# Patient Record
Sex: MALE | Race: WHITE | NOT HISPANIC OR LATINO | Employment: FULL TIME | URBAN - METROPOLITAN AREA
[De-identification: names, ages, dates, MRNs, and addresses within clinical notes are randomized per-mention and may not be internally consistent; named-entity substitution may affect disease eponyms.]

---

## 2019-08-29 ENCOUNTER — TRANSFERRED RECORDS (OUTPATIENT)
Dept: HEALTH INFORMATION MANAGEMENT | Facility: CLINIC | Age: 59
End: 2019-08-29

## 2019-09-06 ENCOUNTER — TRANSFERRED RECORDS (OUTPATIENT)
Dept: HEALTH INFORMATION MANAGEMENT | Facility: CLINIC | Age: 59
End: 2019-09-06

## 2020-08-26 ENCOUNTER — TRANSFERRED RECORDS (OUTPATIENT)
Dept: HEALTH INFORMATION MANAGEMENT | Facility: CLINIC | Age: 60
End: 2020-08-26

## 2020-09-02 ENCOUNTER — TRANSFERRED RECORDS (OUTPATIENT)
Dept: HEALTH INFORMATION MANAGEMENT | Facility: CLINIC | Age: 60
End: 2020-09-02

## 2020-09-02 LAB
ALT SERPL-CCNC: 15 U/L
AST SERPL-CCNC: 17 U/L
CHOLESTEROL (EXTERNAL): 214 MG/DL
CREATININE (EXTERNAL): 1.1 MG/DL (ref 0.7–1.2)
GFR ESTIMATED (EXTERNAL): 74 ML/MIN/1.73M2
GLUCOSE (EXTERNAL): 92 MG/DL (ref 70–99)
HDLC SERPL-MCNC: 59 MG/DL
LDL CHOLESTEROL (EXTERNAL): 137 MG/DL
POTASSIUM (EXTERNAL): 5.8 MMOL/L (ref 3.3–5.1)
TRIGLYCERIDES (EXTERNAL): 92 MG/DL
TSH SERPL-ACNC: 5.8 MIU/ML (ref 0.27–4.2)

## 2020-09-04 ENCOUNTER — TRANSFERRED RECORDS (OUTPATIENT)
Dept: HEALTH INFORMATION MANAGEMENT | Facility: CLINIC | Age: 60
End: 2020-09-04

## 2020-09-11 LAB — POTASSIUM (EXTERNAL): 5.2 MMOL/L (ref 3.3–5.1)

## 2020-10-02 ENCOUNTER — TRANSFERRED RECORDS (OUTPATIENT)
Dept: HEALTH INFORMATION MANAGEMENT | Facility: CLINIC | Age: 60
End: 2020-10-02

## 2020-10-02 LAB — POTASSIUM (EXTERNAL): 4.5 MMOL/L (ref 3.3–5.1)

## 2020-10-09 LAB — TSH SERPL-ACNC: 2.81 MIU/ML (ref 0.27–4.2)

## 2021-06-25 ENCOUNTER — TRANSFERRED RECORDS (OUTPATIENT)
Dept: HEALTH INFORMATION MANAGEMENT | Facility: CLINIC | Age: 61
End: 2021-06-25

## 2021-10-08 ENCOUNTER — TRANSFERRED RECORDS (OUTPATIENT)
Dept: HEALTH INFORMATION MANAGEMENT | Facility: CLINIC | Age: 61
End: 2021-10-08

## 2021-10-08 LAB
ALT SERPL-CCNC: 17 U/L
AST SERPL-CCNC: 17 U/L
CHOLESTEROL (EXTERNAL): 225 MG/DL
CREATININE (EXTERNAL): 1.1 MG/DL (ref 0.7–1.2)
GFR ESTIMATED (EXTERNAL): 74 ML/MIN/1.73M2
GLUCOSE (EXTERNAL): 100 MG/DL (ref 70–99)
HDLC SERPL-MCNC: 63 MG/DL
LDL CHOLESTEROL (EXTERNAL): 138 MG/DL
POTASSIUM (EXTERNAL): 5.1 MMOL/L (ref 3.3–5.1)
TRIGLYCERIDES (EXTERNAL): 118 MG/DL
TSH SERPL-ACNC: 4.54 MIU/ML (ref 0.27–4.2)

## 2022-08-19 ENCOUNTER — TRANSFERRED RECORDS (OUTPATIENT)
Dept: HEALTH INFORMATION MANAGEMENT | Facility: CLINIC | Age: 62
End: 2022-08-19

## 2022-10-22 ENCOUNTER — HEALTH MAINTENANCE LETTER (OUTPATIENT)
Age: 62
End: 2022-10-22

## 2022-10-25 ENCOUNTER — VIRTUAL VISIT (OUTPATIENT)
Dept: FAMILY MEDICINE | Facility: CLINIC | Age: 62
End: 2022-10-25
Payer: COMMERCIAL

## 2022-10-25 DIAGNOSIS — R06.83 SNORING: ICD-10-CM

## 2022-10-25 DIAGNOSIS — M25.562 CHRONIC PAIN OF LEFT KNEE: ICD-10-CM

## 2022-10-25 DIAGNOSIS — G47.00 INSOMNIA, UNSPECIFIED TYPE: ICD-10-CM

## 2022-10-25 DIAGNOSIS — Z80.9 FAMILY HISTORY OF SQUAMOUS CELL CARCINOMA: ICD-10-CM

## 2022-10-25 DIAGNOSIS — G89.29 CHRONIC PAIN OF LEFT KNEE: ICD-10-CM

## 2022-10-25 DIAGNOSIS — Z76.89 ENCOUNTER TO ESTABLISH CARE WITH NEW DOCTOR: Primary | ICD-10-CM

## 2022-10-25 DIAGNOSIS — F90.0 ADHD (ATTENTION DEFICIT HYPERACTIVITY DISORDER), INATTENTIVE TYPE: ICD-10-CM

## 2022-10-25 PROBLEM — E78.00 HYPERCHOLESTEROLEMIA: Status: ACTIVE | Noted: 2020-09-07

## 2022-10-25 PROBLEM — M20.011 ACQUIRED MALLET DEFORMITY OF RIGHT LITTLE FINGER: Status: ACTIVE | Noted: 2017-04-06

## 2022-10-25 PROBLEM — L82.1 SEBORRHEIC KERATOSES: Status: ACTIVE | Noted: 2017-08-04

## 2022-10-25 PROBLEM — N40.1 BENIGN PROSTATIC HYPERPLASIA WITH LOWER URINARY TRACT SYMPTOMS: Status: ACTIVE | Noted: 2018-08-09

## 2022-10-25 PROBLEM — L81.9 ATYPICAL PIGMENTED SKIN LESION: Status: ACTIVE | Noted: 2017-07-21

## 2022-10-25 PROBLEM — R79.89 LOW VITAMIN B12 LEVEL: Status: ACTIVE | Noted: 2022-05-01

## 2022-10-25 PROBLEM — R73.01 ELEVATED FASTING BLOOD SUGAR: Status: ACTIVE | Noted: 2021-10-12

## 2022-10-25 PROBLEM — M54.50 LUMBAR BACK PAIN: Status: ACTIVE | Noted: 2022-03-25

## 2022-10-25 PROCEDURE — 99204 OFFICE O/P NEW MOD 45 MIN: CPT | Mod: 95 | Performed by: FAMILY MEDICINE

## 2022-10-25 RX ORDER — TRAZODONE HYDROCHLORIDE 100 MG/1
100 TABLET ORAL AT BEDTIME
Qty: 90 TABLET | Refills: 3 | Status: SHIPPED | OUTPATIENT
Start: 2022-10-25 | End: 2023-10-27

## 2022-10-25 RX ORDER — DEXTROAMPHETAMINE SACCHARATE, AMPHETAMINE ASPARTATE, DEXTROAMPHETAMINE SULFATE AND AMPHETAMINE SULFATE 7.5; 7.5; 7.5; 7.5 MG/1; MG/1; MG/1; MG/1
1.5 TABLET ORAL DAILY
COMMUNITY
Start: 2012-10-24

## 2022-10-25 RX ORDER — SILDENAFIL 100 MG/1
1 TABLET, FILM COATED ORAL DAILY
COMMUNITY
Start: 2012-10-24 | End: 2023-03-16

## 2022-10-25 NOTE — Clinical Note
Please abstract the following data from this visit with this patient into the appropriate field in Epic:    Other Tests found in the patient's chart through Chart Review/Care Everywhere:  Colonoscopy done by this group [facility] on this date: 2019  Note to Abstraction: If this section is blank, no results were found via Chart Review/Care Everywhere.

## 2022-10-25 NOTE — PROGRESS NOTES
Answers for HPI/ROS submitted by the patient on 10/24/2022  What is the reason for your visit today? : new patient first appointment  How many servings of fruits and vegetables do you eat daily?: 2-3  On average, how many sweetened beverages do you drink each day (Examples: soda, juice, sweet tea, etc.  Do NOT count diet or artificially sweetened beverages)?: 2  How many minutes a day do you exercise enough to make your heart beat faster?: 10 to 19  How many days a week do you exercise enough to make your heart beat faster?: 4  How many days per week do you miss taking your medication?: 0    Bob is a 61 year old who is being evaluated via a billable video visit.      How would you like to obtain your AVS? MyChart  If the video visit is dropped, the invitation should be resent by: Text to cell phone: [phone]  Will anyone else be joining your video visit? No        Assessment & Plan     Encounter to establish care with new doctor  New to me today; reviewed and updated past medical history and problem list and allergies via chart review ([facility], Care Everywhere) and with patient.      ADHD (attention deficit hyperactivity disorder), inattentive type    Discussed at length    Hasn't had formal evaluation he doesn't think, but been on Adderall for ~10 yrs through PCP in [place]. Referred.    Discussed ADHD agreement and routine utox; will do with physical in December    For now - stop midday dose and see if helps sleep.    Insomnia, unspecified type    Discussed trazodone, sleep hygiene    Also discussed stopping midday Adderall to see if helps and may not need trazodone    Will follow at visit in December  - traZODone (DESYREL) 100 MG tablet; Take 1 tablet (100 mg) by mouth At Bedtime    Chronic pain of left knee  Referred sports medicine - Dr. Patel, Dr. Mares or Dr. Butler are all great.  - Orthopedic  Referral; Future    Family history of squamous cell carcinoma    Dad  of SCC.    Referred to derm  "to establish care  - Adult Dermatology Referral; Future    Snoring  - Adult Sleep Eval & Management  Referral; Future        Return for already scheduled appt.    Sybil Arellano MD  [facility] REJI Rojas is a 61 year old, presenting for the following health issues:  Establish Care (No concerns )      History of Present Illness       Reason for visit:  New patient first appointment    He eats 2-3 servings of fruits and vegetables daily.He consumes 2 sweetened beverage(s) daily.He exercises with enough effort to increase his heart rate 10 to 19 minutes per day.  He exercises with enough effort to increase his heart rate 4 days per week.   He is taking medications regularly.       61 year old - moved to [place] from [place].  A few things - to establish care.    Has a few questions:  1. Trazodone?  Has taken before, out of prescription.  Been taking more often lately as older harder to sleep.  50 mg.  Takes 100 mg - 50 mg doesn't work.  No grogginess on a normal night (unless takes too late).    2. Also - Adderall 30 mg.  Used to get through prior doctor in [place].  Good through next month.  Then will need renewal.    Takes 8 am.  Half dose at noon.    Has taken for 10 year.    Originally: diagnosed by PCP.    Also: for history - had knee ACL surgery replacement cadaver 20 yrs ago.  Notices still a little weak.  Doesn't want another surgery.    Finally:  Recommendation for derm.  Dad  of squamous cell and  of skin cancer.    Also:  Imm questions.    Also:  Breathing at night?  Get sleep study?    Also:  Sildenafil not working as well.          Review of Systems   Constitutional, HEENT, cardiovascular, pulmonary, gi and gu systems are negative, except as otherwise noted.      Objective    Vitals - Patient Reported  Weight (Patient Reported): 195 lb (88.5 kg)  Height (Patient Reported): 5' 9\" (175.3 cm)  BMI (Based on Pt Reported Ht/Wt): 28.8        Physical " Exam   GENERAL: Healthy, alert and no distress  EYES: Eyes grossly normal to inspection.  No discharge or erythema, or obvious scleral/conjunctival abnormalities.  RESP: No audible wheeze, cough, or visible cyanosis.  No visible retractions or increased work of breathing.    SKIN: Visible skin clear. No significant rash, abnormal pigmentation or lesions.  NEURO: Cranial nerves grossly intact.  Mentation and speech appropriate for age.  PSYCH: Mentation appears normal, affect normal/bright, judgement and insight intact, normal speech and appearance well-groomed.            Video-Visit Details    Video Start Time: 9:43 AM    Type of service:  Video Visit    Video End Time:10:06 AM    Originating Location (pt. Location): Home    Distant Location (provider location):  On-site    Platform used for Video Visit: KarisWell

## 2022-11-21 ENCOUNTER — HOSPITAL ENCOUNTER (OUTPATIENT)
Dept: BEHAVIORAL HEALTH | Facility: CLINIC | Age: 62
Discharge: HOME | End: 2022-11-21
Attending: FAMILY MEDICINE

## 2022-11-21 DIAGNOSIS — F98.8 ATTENTION DEFICIT DISORDER, UNSPECIFIED HYPERACTIVITY PRESENCE: Primary | ICD-10-CM

## 2022-11-21 PROCEDURE — 999N000216 HC STATISTIC ADULT CD FACE TO FACE-NO CHRG: Mod: GT | Performed by: PSYCHOLOGIST

## 2022-11-21 ASSESSMENT — ANXIETY QUESTIONNAIRES
GAD7 TOTAL SCORE: 2
5. BEING SO RESTLESS THAT IT IS HARD TO SIT STILL: SEVERAL DAYS
2. NOT BEING ABLE TO STOP OR CONTROL WORRYING: NOT AT ALL
3. WORRYING TOO MUCH ABOUT DIFFERENT THINGS: NOT AT ALL
6. BECOMING EASILY ANNOYED OR IRRITABLE: NOT AT ALL
1. FEELING NERVOUS, ANXIOUS, OR ON EDGE: SEVERAL DAYS
7. FEELING AFRAID AS IF SOMETHING AWFUL MIGHT HAPPEN: NOT AT ALL
4. TROUBLE RELAXING: NOT AT ALL
GAD7 TOTAL SCORE: 2

## 2022-11-21 ASSESSMENT — COLUMBIA-SUICIDE SEVERITY RATING SCALE - C-SSRS
2. HAVE YOU ACTUALLY HAD ANY THOUGHTS OF KILLING YOURSELF IN THE PAST MONTH?: NO
3. HAVE YOU BEEN THINKING ABOUT HOW YOU MIGHT KILL YOURSELF?: NO
5. HAVE YOU STARTED TO WORK OUT OR WORKED OUT THE DETAILS OF HOW TO KILL YOURSELF? DO YOU INTEND TO CARRY OUT THIS PLAN?: NO
1. IN THE PAST MONTH, HAVE YOU WISHED YOU WERE DEAD OR WISHED YOU COULD GO TO SLEEP AND NOT WAKE UP?: NO
6. HAVE YOU EVER DONE ANYTHING, STARTED TO DO ANYTHING, OR PREPARED TO DO ANYTHING TO END YOUR LIFE?: NO
4. HAVE YOU HAD THESE THOUGHTS AND HAD SOME INTENTION OF ACTING ON THEM?: NO

## 2022-11-21 ASSESSMENT — PATIENT HEALTH QUESTIONNAIRE - PHQ9: SUM OF ALL RESPONSES TO PHQ QUESTIONS 1-9: 1

## 2022-11-21 NOTE — PROGRESS NOTES
"[facility]    ADULT Mental Health Assessment Appointment Note    Patient name:  Bob Rodriguez    Preferred Pronoun: kristie him  MRN: [id number]  YOB: 1960  Address:  [address] [place] [id number]  Preferred phone: [phone]   May we leave a referral related message: Yes    Date of service: 22  Start time: 0800  End time: 0845  Service modality:  Video Visit:      Provider verified identity through the following two step process.  Patient provided:  Patient  and Patient address    Telemedicine Visit: The patient's condition can be safely assessed and treated via synchronous audio and visual telemedicine encounter.      Reason for Telemedicine Visit: Services only offered telehealth    Originating Site (Patient Location): Patient's home    Distant Site (Provider Location): [facility]    Consent:  The patient/guardian has verbally consented to: the potential risks and benefits of telemedicine (video visit) versus in person care; bill my insurance or make self-payment for services provided; and responsibility for payment of non-covered services.     Patient would like the video invitation sent by:  My Chart    Mode of Communication:  Video Conference via Amdianboom    Distant Location (Provider):  Off-site    As the provider I attest to compliance with applicable laws and regulations related to telemedicine.    Identifying Information:  Patient is a 62 year old, male.  Patient was referred for an assessment by Dr Arellano at  [facility].  Patient attended the session alone. Patient identified their preferred language to be English. Patient reported they do not need the assistance of an  or other support involved in therapy.     Services Requested:   The reason for seeking services at this time is: \" evaluation of continued medication management / ADHD \"  Patient does " not have legal concerns.  Pt reports he is a 62 year old male who presents today with need to transfer care to [place] from [place] where he has been treated for ADD for over a decade. He reports he was originally diagnosed by his [place] PCP. He moved to [place] a few months ago and his original prescriber has filled his meds again recently until he can find care in this state.  Pt moved to [place] to be closer to his aging mother and extended family.  He has no hx of other mental health treatment.  He was referred to this department to assist him in his attempt to find a provider who will continue his prescription.  He has also been referred for a sleep study.    Mental Health:  Review of Symptoms per patient report:  Depression: Change in sleep  Maryellen: No Symptoms  Psychosis: No Symptoms  Anxiety: occasionally feel anxious, maybe a day in the past two weeks  Panic: No symptoms  Post Traumatic Stress Disorder: No Symptoms  Eating Disorder: No Symptoms  ADD / ADHD: Inattentive, Difficulties listening, Poor task completion, Poor organizational skills, Distractibility, Forgetful and Restlessness/fidgety  Conduct Disorder: No symptoms  Autism Spectrum Disorder: No symptoms  Obsessive Compulsive Disorder: No Symptoms    Substance Use:  Do you  have a history of alcohol or illicit drug use? No and CAGE of 0 with alcohol use about every month a drink, last use 10/29  Never required treatment of any kind    Significant Losses / Trauma / Abuse / Neglect Issues:   Patient did not serve in the .  There are indications or report of significant loss, trauma, abuse or neglect issues related to: are no indications and client denies any losses, trauma, abuse, or neglect concerns.  Concerns for possible neglect are not present.     Medical History:  Patient reports no current medical concerns.  Patient denies any issues with pain..   There are not significant appetite / nutritional concerns / weight changes.   Patient does not report a  history of head injury / trauma / cognitive impairment.      Current Mental Status Exam:   Appearance:  Appropriate    Eye Contact:  Good   Psychomotor:  Normal   Attitude / Demeanor: Cooperative  Pleasant  Speech Rate:  Normal/ Responsive  Volume:  Normal  volume  Language:  intact  Mood:   Normal  Affect:   Appropriate    Thought Content: Clear   Thought Process: Goal Directed  Logical     Associations:  No loosening of associations  Insight:   Good   Judgment:  Intact   Orientation:  All  Attention:  Good    Rating Scales:  PHQ9:    PHQ-9 SCORE 11/21/2022   PHQ-9 Total Score 1   ;    GAD7:  No flowsheet data found.  [place] Suicide Severity Rating Scale (Lifetime/Recent)  [place] Suicide Severity Rating (Lifetime/Recent) 11/21/2022   Q1 Wished to be Dead (Past Month) no   Q2 Suicidal Thoughts (Past Month) no   Q3 Suicidal Thought Method no   Q4 Suicidal Intent without Specific Plan no   Q5 Suicide Intent with Specific Plan no   Q6 Suicide Behavior (Lifetime) no   Level of Risk per Screen low risk       Safety Assessment:   Current Safety Concerns:  [place] Suicide Severity Rating Scale (Lifetime/Recent)  [place] Suicide Severity Rating (Lifetime/Recent) 11/21/2022   Q1 Wished to be Dead (Past Month) no   Q2 Suicidal Thoughts (Past Month) no   Q3 Suicidal Thought Method no   Q4 Suicidal Intent without Specific Plan no   Q5 Suicide Intent with Specific Plan no   Q6 Suicide Behavior (Lifetime) no   Level of Risk per Screen low risk     Patient denies current homicidal ideation and behaviors.  Patient denies current self-injurious ideation and behaviors.    Patient denied risk behaviors associated with substance use.  Patient denies any high risk behaviors associated with mental health symptoms.  Patient reports the following current concerns for their personal safety: None.  Patient reports there are not  firearms in the house. There are no firearms in the home..     Clinical Impressions:  pt reports prior hx of  dx ADHD.  He will be referred for a formal evaluation.  He has no other mental health dx      Clinical Substantiation:  Summary of Visit: pt is a 62 year old gentleman who presents for assistance in transferring his medical care to [place] providers.  His PCP referred him and may have thought he would be receiving an ADHD evaluation which this department does not offer.  An order was placed for a formal ADHD evaluation and an appointment was made for pt to see a mental health prescriber who may be able to continue pt's prescriptions at this time.  Pt has no hx of acute care, no hx of other mental health concerns.  He is highly functional and without problems in judgment.  No SI, HI or SIB and no hx of such.      Therapeutic Interventions Provided: supportive active listening, provided Psychoeducational support and identified problem solving techniques     Recommendations/Plan: entered Order for ADHD eval and set appointment as noted below.      Referral: Date: Tuesday, 11/29/2022  Time: 11:00 am - 12:00 pm  Provider: Marcello CR  CNP,PMHNP,RN  Location: [address] [place] [address]  Phone: [phone]  Type: Telepsychiatry      Susan Shelton, [facility],  November 21, 2022  [facility]

## 2022-11-21 NOTE — PATIENT INSTRUCTIONS
Dear Bob,    This is a reminder that an appointment for mental health services has been scheduled for you.  Please contact your insurance company directly to verify coverage, eligibility, payment, and  benefit information.    Appointment Date: 11/29/2022  Appointment Time: 11:00 AM  Location: [facility]  Marcello Sepulveda CNP,PMZOP,RN  [address] [place] [id number] [phone]    For directions and/or questions regarding the appointment, please contact the clinic or provider  directly at the phone number listed above.    Your appointment is scheduled outside of [facility]. Behavioral Healthcare  Providers ([facility]) maintains an extensive network of licensed behavioral health providers to  connect patients with the services they need. We do not charge providers a fee to participate  in our referral network. We match patients with providers based on a patient s specific  treatment needs, insurance coverage, and location. Our first effort will be to refer you to a  provider within your care system and will utilize providers outside of your care system as  needed.    Sincerely,  [facility] Behavioral Health Access Staff  [facility]  [address] [place] [id number] [phone]

## 2022-11-22 ENCOUNTER — MYC MEDICAL ADVICE (OUTPATIENT)
Dept: FAMILY MEDICINE | Facility: CLINIC | Age: 62
End: 2022-11-22

## 2022-11-23 NOTE — TELEPHONE ENCOUNTER
Patient called in to check status on questions. Is needing clarification that he can have exam done by outside organization and that it will still be valid. Follow up via MyC

## 2022-12-01 ENCOUNTER — MYC MEDICAL ADVICE (OUTPATIENT)
Dept: FAMILY MEDICINE | Facility: CLINIC | Age: 62
End: 2022-12-01

## 2022-12-17 ENCOUNTER — NURSE TRIAGE (OUTPATIENT)
Dept: NURSING | Facility: CLINIC | Age: 62
End: 2022-12-17

## 2022-12-17 NOTE — TELEPHONE ENCOUNTER
Nurse Triage SBAR    Situation: Pt calls to report positive home test for covid.    Background: Patient,Bob, calling. Consent: not needed.    Assessment:  Sx started yesterday: headaches, congestion, cough, low grade fever, runny nose, sneezing.  Temp: 99.5    Protocol Recommended Disposition: See Physician within 24 hours    Recommendation: According to the protocol, Patient should be seen within 24 hours. Advised Patient to be seen within 24 hours. Care advice given. Patient verbalizes understanding and agrees with plan of care. Reviewed concerning symptoms and when to call back. Connected with scheduling    Clare Hoover RN on 12/17/2022 at 11:54 AM      Reason for Disposition    [1] HIGH RISK patient AND [2] influenza exposure within the last 7 days AND [3] ONE OR MORE respiratory symptoms: cough, sore throat, runny or stuffy nose    Additional Information    Negative: SEVERE difficulty breathing (e.g., struggling for each breath, speaks in single words)    Negative: Bluish (or gray) lips or face now    Negative: Shock suspected (e.g., cold/pale/clammy skin, too weak to stand, low BP, rapid pulse)    Negative: Sounds like a life-threatening emergency to the triager    Negative: Difficult to awaken or acting confused (e.g., disoriented, slurred speech)    Negative: SEVERE or constant chest pain or pressure  (Exception: Mild central chest pain, present only when coughing.)    Negative: MODERATE difficulty breathing (e.g., speaks in phrases, SOB even at rest, pulse 100-120)    Negative: [1] Headache AND [2] stiff neck (can't touch chin to chest)    Negative: Chest pain or pressure    Negative: Patient sounds very sick or weak to the triager    Negative: Oxygen level (e.g., pulse oximetry) 90 percent or lower    Negative: HIGH RISK for severe COVID complications (e.g., weak immune system, age > 64 years, obesity with BMI > 25, pregnant, chronic lung disease or other chronic medical condition)  (Exception: Already  seen by PCP and no new or worsening symptoms.)    Negative: [1] HIGH RISK patient AND [2] influenza is widespread in the community AND [3] ONE OR MORE respiratory symptoms: cough, sore throat, runny or stuffy nose    Protocols used: CORONAVIRUS (COVID-19) DIAGNOSED OR [id number] 1.18.2022

## 2022-12-19 ENCOUNTER — VIRTUAL VISIT (OUTPATIENT)
Dept: FAMILY MEDICINE | Facility: CLINIC | Age: 62
End: 2022-12-19
Payer: COMMERCIAL

## 2022-12-19 DIAGNOSIS — R09.81 NASAL CONGESTION: ICD-10-CM

## 2022-12-19 DIAGNOSIS — R50.9 FEVER AND CHILLS: ICD-10-CM

## 2022-12-19 DIAGNOSIS — U07.1 INFECTION DUE TO 2019 NOVEL CORONAVIRUS: Primary | ICD-10-CM

## 2022-12-19 DIAGNOSIS — B97.89 SORE THROAT (VIRAL): ICD-10-CM

## 2022-12-19 DIAGNOSIS — J02.8 SORE THROAT (VIRAL): ICD-10-CM

## 2022-12-19 DIAGNOSIS — R53.81 MALAISE: ICD-10-CM

## 2022-12-19 DIAGNOSIS — R05.1 ACUTE COUGH: ICD-10-CM

## 2022-12-19 PROCEDURE — 99213 OFFICE O/P EST LOW 20 MIN: CPT | Mod: CS | Performed by: INTERNAL MEDICINE

## 2022-12-19 RX ORDER — GUAIFENESIN/DEXTROMETHORPHAN 100-10MG/5
10 SYRUP ORAL EVERY 4 HOURS PRN
Qty: 354 ML | Refills: 3 | Status: SHIPPED | OUTPATIENT
Start: 2022-12-19 | End: 2022-12-29

## 2022-12-19 RX ORDER — PREDNISONE 20 MG/1
TABLET ORAL
Qty: 20 TABLET | Refills: 0 | Status: SHIPPED | OUTPATIENT
Start: 2022-12-19 | End: 2022-12-29

## 2022-12-19 RX ORDER — NIRMATRELVIR AND RITONAVIR 300-100 MG
3 KIT ORAL 2 TIMES DAILY
Qty: 30 EACH | Refills: 0 | Status: SHIPPED | OUTPATIENT
Start: 2022-12-19 | End: 2022-12-29

## 2022-12-19 NOTE — PROGRESS NOTES
Bob is a 62 year old who is being evaluated via a billable telephone visit.      What phone number would you like to be contacted at? [phone]  How would you like to obtain your AVS? Daniele  Distant Location (provider location):  Off-site         Subjective   Bob is a 62 year old presenting for the following health issues:  Covid Concern      HPI       COVID-19 Symptom Review  How many days ago did these symptoms start? 12/16/2022    Are any of the following symptoms significant for you?    New or worsening difficulty breathing? No    Worsening cough? No    Fever or chills? Yes, I felt feverish or had chills.    Headache: YES    Sore throat: YES    Chest pain: No    Diarrhea: No    Body aches? YES    What treatments has patient tried? Acetaminophen   Does patient live in a nursing home, group home, or shelter? No  Does patient have a way to get food/medications during quarantined? Yes, I have a friend or family member who can help me.      Current Medications:     Current Outpatient Medications   Medication Sig Dispense Refill     amphetamine-dextroamphetamine (ADDERALL) 30 MG tablet Take 1.5 tablets by mouth daily       guaiFENesin-dextromethorphan (ROBITUSSIN DM) 100-10 MG/5ML syrup Take 10 mLs by mouth every 4 hours as needed for cough 354 mL 3     nirmatrelvir and ritonavir (PAXLOVID, 300/100,) therapy pack Take 3 tablets by mouth 2 times daily 30 each 0     predniSONE (DELTASONE) 20 MG tablet Take 3 tabs by mouth daily x 3 days, then 2 tabs daily x 3 days, then 1 tab daily x 3 days, then 1/2 tab daily x 3 days. 20 tablet 0     sildenafil (VIAGRA) 100 MG tablet Take 1 tablet by mouth daily       traZODone (DESYREL) 100 MG tablet Take 1 tablet (100 mg) by mouth At Bedtime 90 tablet 3         Allergies:      Allergies   Allergen Reactions     No Known Allergies             Past Medical History:     Past Medical History:   Diagnosis Date     Degenerative arthritis of knee 11/15/2012         Past Surgical  History:     Past Surgical History:   Procedure Laterality Date     COLONOSCOPY  03/01/2019     ORTHOPEDIC SURGERY  2004         Family Medical History:     Family History   Problem Relation Age of Onset     Other Cancer Father         Father passed away from skin cancer         Social History:     Social History     Socioeconomic History     Marital status:      Spouse name: Not on file     Number of children: Not on file     Years of education: Not on file     Highest education level: Not on file   Occupational History     Not on file   Tobacco Use     Smoking status: Never     Smokeless tobacco: Never   Vaping Use     Vaping Use: Never used   Substance and Sexual Activity     Alcohol use: Yes     Comment: infrequent drinker - one glass per week     Drug use: Never     Sexual activity: Yes     Partners: Female     Birth control/protection: Male Surgical   Other Topics Concern     Parent/sibling w/ CABG, MI or angioplasty before 65F 55M? No   Social History Narrative     Not on file     Social Determinants of Health     Financial Resource Strain: Not on file   Food Insecurity: Not on file   Transportation Needs: Not on file   Physical Activity: Not on file   Stress: Not on file   Social Connections: Not on file   Intimate Partner Violence: Not on file   Housing Stability: Not on file           Review of System:     Constitutional: positive for fever or chills  Skin: Negative for rashes  Ears/Nose/Throat: positive for nasal congestion, sore throat  Respiratory: positive for cough, COVID infection  Cardiovascular: Negative for chest pain  Gastrointestinal: Negative for nausea, vomiting  Genitourinary: Negative for dysuria, hematuria  Musculoskeletal: Negative for myalgias  Neurologic: Negative for headaches  Psychiatric: Negative for depression, anxiety  Hematologic/Lymphatic/Immunologic: Negative  Endocrine: Negative  Behavioral: Negative for tobacco use       Physical Exam:   There were no vitals taken for  this visit.    RESP: cough present   NEURO: Alert & Oriented x 3.   PSYCH: mentation appears normal, affect normal        Diagnostic Test Results:     Diagnostic Test Results:  Labs reviewed in Epic    ASSESSMENT/PLAN:       Bob was seen today for covid concern.    Diagnoses and all orders for this visit:    Infection due to 2019 novel coronavirus  -     predniSONE (DELTASONE) 20 MG tablet; Take 3 tabs by mouth daily x 3 days, then 2 tabs daily x 3 days, then 1 tab daily x 3 days, then 1/2 tab daily x 3 days.  -     nirmatrelvir and ritonavir (PAXLOVID, 300/100,) therapy pack; Take 3 tablets by mouth 2 times daily  -     guaiFENesin-dextromethorphan (ROBITUSSIN DM) 100-10 MG/5ML syrup; Take 10 mLs by mouth every 4 hours as needed for cough    Acute cough    Malaise    Fever and chills    Nasal congestion    Sore throat (viral)            Follow Up Plan:     Patient is instructed to return to Internal Medicine clinic for follow-up visit in 1 week.        Gracie De La Cruz MD  Internal Medicine  [facility]        telephone visit duration including chart review, medication management: 30 minutes

## 2022-12-29 ENCOUNTER — OFFICE VISIT (OUTPATIENT)
Dept: FAMILY MEDICINE | Facility: CLINIC | Age: 62
End: 2022-12-29
Payer: COMMERCIAL

## 2022-12-29 ENCOUNTER — ANCILLARY PROCEDURE (OUTPATIENT)
Dept: GENERAL RADIOLOGY | Facility: CLINIC | Age: 62
End: 2022-12-29
Attending: FAMILY MEDICINE
Payer: COMMERCIAL

## 2022-12-29 VITALS
OXYGEN SATURATION: 97 % | WEIGHT: 204 LBS | HEART RATE: 67 BPM | BODY MASS INDEX: 30.21 KG/M2 | TEMPERATURE: 97.3 F | RESPIRATION RATE: 20 BRPM | SYSTOLIC BLOOD PRESSURE: 124 MMHG | HEIGHT: 69 IN | DIASTOLIC BLOOD PRESSURE: 70 MMHG

## 2022-12-29 DIAGNOSIS — M17.12 OSTEOARTHRITIS OF LEFT KNEE, UNSPECIFIED OSTEOARTHRITIS TYPE: ICD-10-CM

## 2022-12-29 DIAGNOSIS — E78.00 HYPERCHOLESTEROLEMIA: ICD-10-CM

## 2022-12-29 DIAGNOSIS — N52.9 ERECTILE DYSFUNCTION, UNSPECIFIED ERECTILE DYSFUNCTION TYPE: ICD-10-CM

## 2022-12-29 DIAGNOSIS — Z12.5 SCREENING FOR PROSTATE CANCER: ICD-10-CM

## 2022-12-29 DIAGNOSIS — F90.0 ADHD (ATTENTION DEFICIT HYPERACTIVITY DISORDER), INATTENTIVE TYPE: ICD-10-CM

## 2022-12-29 DIAGNOSIS — M25.562 ACUTE PAIN OF LEFT KNEE: ICD-10-CM

## 2022-12-29 DIAGNOSIS — Z98.890 HISTORY OF REPAIR OF ANTERIOR CRUCIATE LIGAMENT OF LEFT KNEE: ICD-10-CM

## 2022-12-29 DIAGNOSIS — Z00.00 ROUTINE GENERAL MEDICAL EXAMINATION AT A HEALTH CARE FACILITY: Primary | ICD-10-CM

## 2022-12-29 LAB
CHOLEST SERPL-MCNC: 221 MG/DL
HDLC SERPL-MCNC: 44 MG/DL
LDLC SERPL CALC-MCNC: 154 MG/DL
NONHDLC SERPL-MCNC: 177 MG/DL
PSA SERPL-MCNC: 0.48 NG/ML (ref 0–4.5)
TRIGL SERPL-MCNC: 117 MG/DL

## 2022-12-29 PROCEDURE — 99396 PREV VISIT EST AGE 40-64: CPT | Mod: 25 | Performed by: FAMILY MEDICINE

## 2022-12-29 PROCEDURE — 90715 TDAP VACCINE 7 YRS/> IM: CPT | Performed by: FAMILY MEDICINE

## 2022-12-29 PROCEDURE — 80048 BASIC METABOLIC PNL TOTAL CA: CPT | Performed by: FAMILY MEDICINE

## 2022-12-29 PROCEDURE — 99214 OFFICE O/P EST MOD 30 MIN: CPT | Mod: 25 | Performed by: FAMILY MEDICINE

## 2022-12-29 PROCEDURE — 90682 RIV4 VACC RECOMBINANT DNA IM: CPT | Performed by: FAMILY MEDICINE

## 2022-12-29 PROCEDURE — 36415 COLL VENOUS BLD VENIPUNCTURE: CPT | Performed by: FAMILY MEDICINE

## 2022-12-29 PROCEDURE — 80061 LIPID PANEL: CPT | Performed by: FAMILY MEDICINE

## 2022-12-29 PROCEDURE — G0103 PSA SCREENING: HCPCS | Performed by: FAMILY MEDICINE

## 2022-12-29 PROCEDURE — 90471 IMMUNIZATION ADMIN: CPT | Performed by: FAMILY MEDICINE

## 2022-12-29 PROCEDURE — 73560 X-RAY EXAM OF KNEE 1 OR 2: CPT | Mod: TC | Performed by: RADIOLOGY

## 2022-12-29 PROCEDURE — 90472 IMMUNIZATION ADMIN EACH ADD: CPT | Performed by: FAMILY MEDICINE

## 2022-12-29 PROCEDURE — 90750 HZV VACC RECOMBINANT IM: CPT | Performed by: FAMILY MEDICINE

## 2022-12-29 RX ORDER — TADALAFIL 2.5 MG/1
2.5 TABLET ORAL DAILY
Qty: 90 TABLET | Refills: 1 | Status: SHIPPED | OUTPATIENT
Start: 2022-12-29 | End: 2023-03-16

## 2022-12-29 NOTE — PATIENT INSTRUCTIONS
1. Think of Nature as a multivitamin that you should take every day.  Make an effort to spend time outside every day.    2. If you spend weir in [place] vitamin D 400-1000 daily is a good idea October-April.    3. Social connections are also like a multivitamin; they give us micro-boosts that keep us healthy and happy. Talk to the check out person in the store, connect with your neighbors.  Make an effort to maintain and sustain social connections in your life.    4. Food is Medicine. The MIND or Mediterranean diet are the best for heart and brain health as well as have a lower lifetime cancer risk.    5. Weight lifting exercise 2-3x per week is excellent for bone health and maintaining muscle mass, particularly if you are over 40.  It's also helpful in reducing anxiety and boosting mood.    6. Move your body every day (exercise!).  Exercise is the most effective way to boost mood, reduce anxiety and depression, reduce risks of many chronic diseases and age well.  Find something you enjoy and do it regularly (walk, run, take a dance class, join a gym, ski, roller-blade, get into yoga, learn raffaele chi...)    7. Prioritize your sleep! Adults age 26-64 need 7-9 hours of sleep a night.  Older adults 65+ need 7-8 hours of sleep a night.  Studies show that people who sleep seven hours a night are healthier and live longer. Sleeping less than seven hours is associated with a range of health problems including obesity, dementia, heart disease, depression and impaired immune function.    Patient Education    Preventive Health Recommendations  Male Ages 50 - 64    Yearly exam:             See your health care provider every year in order to  o   Review health changes.   o   Discuss preventive care.    o   Review your medicines if your doctor has prescribed any.     Have a cholesterol test every 5 years, or more frequently if you are at risk for high cholesterol/heart disease.     Have a diabetes test (fasting glucose) every three  years. If you are at risk for diabetes, you should have this test more often.     Have a colonoscopy at age 50, or have a yearly FIT test (stool test). These exams will check for colon cancer.      Talk with your health care provider about whether or not a prostate cancer screening test (PSA) is right for you.    You should be tested each year for STDs (sexually transmitted diseases), if you re at risk.     Shots: Get a flu shot each year. Get a tetanus shot every 10 years.     Nutrition:    Eat at least 5 servings of fruits and vegetables daily.     Eat whole-grain bread, whole-wheat pasta and brown rice instead of white grains and rice.     Get adequate Calcium and Vitamin D.     Lifestyle    Exercise for at least 150 minutes a week (30 minutes a day, 5 days a week). This will help you control your weight and prevent disease.     Limit alcohol to one drink per day.     No smoking.     Wear sunscreen to prevent skin cancer.     See your dentist every six months for an exam and cleaning.     See your eye doctor every 1 to 2 years.

## 2022-12-29 NOTE — PROGRESS NOTES
SUBJECTIVE:   CC: Bob is an 62 year old who presents for preventative health visit.     Patient has been advised of split billing requirements and indicates understanding: Yes  History of Present Illness       Reason for visit:  General checkup as well as get vaccine shots for Flu, Covid, and Shingles    He eats 2-3 servings of fruits and vegetables daily.He consumes 2 sweetened beverage(s) daily.He exercises with enough effort to increase his heart rate 9 or less minutes per day.  He exercises with enough effort to increase his heart rate 3 or less days per week.   He is not taking prescribed medications regularly due to None.  Healthy Habits:    Getting at least 3 servings of Calcium per day:  Yes    Bi-annual eye exam:  Yes    Dental care twice a year:  Yes    Sleep apnea or symptoms of sleep apnea:  None    Diet:  Regular (no restrictions)    Frequency of exercise:  None    Taking medications regularly:  Yes    Barriers to taking medications:  None    Medication side effects:  None    PHQ-2 Total Score:    Additional concerns today:  No          Shingles.    Scheduled to see sleep specialist and dermatologist next year.    Wanted to ask about knee surgery - tore playing soccer in college, then had surgery on it in 2004 ACL.  Noticed lately sore more than normal.  Especially when really cold.  Wonders - is that arthritis?          Today's PHQ-2 Score:   PHQ-2 ( 1999 Pfizer) 12/19/2022   Q1: Little interest or pleasure in doing things 0   Q2: Feeling down, depressed or hopeless 0   PHQ-2 Score 0   Q1: Little interest or pleasure in doing things -   Q2: Feeling down, depressed or hopeless -   PHQ-2 Score -       Have you ever done Advance Care Planning? (For example, a Health Directive, POLST, or a discussion with a medical provider or your loved ones about your wishes): No, advance care planning information given to patient to review.  Patient plans to discuss their wishes with loved ones or provider.      Social  "History     Tobacco Use     Smoking status: Never     Smokeless tobacco: Never   Substance Use Topics     Alcohol use: Yes     Comment: infrequent drinker - one glass per week         No flowsheet data found.    Last PSA: No results found for: PSA    Reviewed orders with patient. Reviewed health maintenance and updated orders accordingly - Yes      Reviewed and updated as needed this visit by clinical staff   Tobacco  Allergies  Meds  Problems  Med Hx  Surg Hx  Fam Hx          Reviewed and updated as needed this visit by Provider   Tobacco  Allergies  Meds  Problems  Med Hx  Surg Hx  Fam Hx             Review of Systems  CONSTITUTIONAL: NEGATIVE for fever, chills, change in weight  INTEGUMENTARY/SKIN: NEGATIVE for worrisome rashes, moles or lesions  EYES: NEGATIVE for vision changes or irritation  ENT: NEGATIVE for ear, mouth and throat problems  RESP: NEGATIVE for significant cough or SOB  CV: NEGATIVE for chest pain, palpitations or peripheral edema  GI: NEGATIVE for nausea, abdominal pain, heartburn, or change in bowel habits   male: negative for dysuria, hematuria, decreased urinary stream, erectile dysfunction, urethral discharge  MUSCULOSKELETAL: NEGATIVE for significant arthralgias or myalgia  NEURO: NEGATIVE for weakness, dizziness or paresthesias  PSYCHIATRIC: NEGATIVE for changes in mood or affect    OBJECTIVE:   /70   Pulse 67   Temp 97.3  F (36.3  C) (Temporal)   Resp 20   Ht 1.75 m (5' 8.9\")   Wt 92.5 kg (204 lb)   SpO2 97%   BMI 30.21 kg/m      Physical Exam  GENERAL: healthy, alert and no distress  EYES: Eyes grossly normal to inspection, PERRL and conjunctivae and sclerae normal  HENT: ear canals and TM's normal, nose and mouth without ulcers or lesions  NECK: no adenopathy, no asymmetry, masses, or scars and thyroid normal to palpation  RESP: lungs clear to auscultation - no rales, rhonchi or wheezes  CV: regular rate and rhythm, normal S1 S2, no S3 or S4, no murmur, " click or rub, no peripheral edema and peripheral pulses strong  ABDOMEN: soft, nontender, no hepatosplenomegaly, no masses and bowel sounds normal  MS: no gross musculoskeletal defects noted, no edema  SKIN: no suspicious lesions or rashes  NEURO: Normal strength and tone, mentation intact and speech normal  PSYCH: mentation appears normal, affect normal/bright    Diagnostic Test Results:  Labs reviewed in Epic    ASSESSMENT/PLAN:   Bob was seen today for recheck medication and physical.    Diagnoses and all orders for this visit:    Routine general medical examination at a health care facility    PSA: done today    Colon: up to date, next due in 2029    Immunizations: Shingrix, flu, TDAP today.  Needs Covid booster but will wait 3 months as just had COVID.    Discussed healthy lifestyle and aging recommendations including regular exercise, adequate and regular sleep, 5+ fruits and veggies daily.    Acute pain of left knee  In setting of history of   Osteoarthritis of left knee, unspecified osteoarthritis type  History of repair of anterior cruciate ligament of left knee    Discussed strategy to avoid surgery and strengthen knee    Referred physical therapy    Xray to check in on arthritis of knee - last in 2011    If worsens, impedes daily activity or daily pain then would refer ortho for further evaluation and possible injection  -     Physical Therapy Referral; Future  -     XR Knee Standing Left 2 Views; Future    Screening for prostate cancer  -     PSA, screen; Future    Erectile dysfunction, unspecified erectile dysfunction type    Will switch from Viagra to daily cialis as has some BPH (BENIGN PROSTATIC HYPERTROPHY) symptoms    If no improvement in ~4 wks, can increase to 5 mg daily    Also referred urology  -     Adult Urology  Referral; Future  -     tadalafil (CIALIS) 2.5 MG tablet; Take 1 tablet (2.5 mg) by mouth daily    Hypercholesterolemia  -     Lipid panel reflex to direct LDL Non-fasting;  "Future    ADHD (attention deficit hyperactivity disorder), inattentive type    Now managed by psych NP    Other orders  -     REVIEW OF HEALTH MAINTENANCE PROTOCOL ORDERS  -     ZOSTER VACCINE RECOMBINANT ADJUVANTED (SHINGRIX)  -     INFLUENZA VACCINE 50-64 OR 18-64 W/EGG ALLERGY (FLUBLOK)  -     TDAP VACCINE (Adacel, Boostrix)        Patient has been advised of split billing requirements and indicates understanding: Yes      COUNSELING:   Reviewed preventive health counseling, as reflected in patient instructions      BMI:   Estimated body mass index is 30.21 kg/m  as calculated from the following:    Height as of this encounter: 1.75 m (5' 8.9\").    Weight as of this encounter: 92.5 kg (204 lb).   Weight management plan: Discussed healthy diet and exercise guidelines      He reports that he has never smoked. He has never used smokeless tobacco.        Sybil Arellano MD  [facility]  "

## 2022-12-29 NOTE — NURSING NOTE
Prior to immunization administration, verified patients identity using patient s name and date of birth. Please see Immunization Activity for additional information.     Screening Questionnaire for Adult Immunization    Are you sick today?   No   Do you have allergies to medications, food, a vaccine component or latex?   No   Have you ever had a serious reaction after receiving a vaccination?   No   Do you have a long-term health problem with heart, lung, kidney, or metabolic disease (e.g., diabetes), asthma, a blood disorder, no spleen, complement component deficiency, a cochlear implant, or a spinal fluid leak?  Are you on long-term aspirin therapy?   No   Do you have cancer, leukemia, HIV/AIDS, or any other immune system problem?   No   Do you have a parent, brother, or sister with an immune system problem?   No   In the past 3 months, have you taken medications that affect  your immune system, such as prednisone, other steroids, or anticancer drugs; drugs for the treatment of rheumatoid arthritis, Crohn s disease, or psoriasis; or have you had radiation treatments?   No   Have you had a seizure, or a brain or other nervous system problem?   No   During the past year, have you received a transfusion of blood or blood    products, or been given immune (gamma) globulin or antiviral drug?   No   For women: Are you pregnant or is there a chance you could become       pregnant during the next month?   No   Have you received any vaccinations in the past 4 weeks?   No     Immunization questionnaire answers were all negative.        Per orders of Dr. davis, injection of tdap, shingles and flu given by Lainey Galaviz MA. Patient instructed to remain in clinic for 15 minutes afterwards, and to report any adverse reaction to me immediately.       Screening performed by Lainey Galaviz MA on 12/29/2022 at 9:17 AM.

## 2022-12-29 NOTE — PROGRESS NOTES
{PROVIDER CHARTING PREFERENCE:863589}    Ted Rojas is a 62 year old{ACCOMPANIED BY STATEMENT (Optional):823007}, presenting for the following health issues:  Recheck Medication      History of Present Illness       Reason for visit:  General checkup as well as get vaccine shots for Flu, Covid, and Shingles    He eats 2-3 servings of fruits and vegetables daily.He consumes 2 sweetened beverage(s) daily.He exercises with enough effort to increase his heart rate 9 or less minutes per day.  He exercises with enough effort to increase his heart rate 3 or less days per week.   He is taking medications regularly.       {SUPERLIST (Optional):882725}  {additonal problems for provider to add (Optional):101957}    Review of Systems   {ROS COMP (Optional):003488}      Objective    There were no vitals taken for this visit.  There is no height or weight on file to calculate BMI.  Physical Exam   {Exam List (Optional):191297}    {Diagnostic Test Results (Optional):314869}    {AMBULATORY ATTESTATION (Optional):637645}

## 2022-12-30 LAB
ANION GAP SERPL CALCULATED.3IONS-SCNC: 15 MMOL/L (ref 7–15)
BUN SERPL-MCNC: 18.9 MG/DL (ref 8–23)
CALCIUM SERPL-MCNC: 9.8 MG/DL (ref 8.8–10.2)
CHLORIDE SERPL-SCNC: 105 MMOL/L (ref 98–107)
CREAT SERPL-MCNC: 1.1 MG/DL (ref 0.67–1.17)
DEPRECATED HCO3 PLAS-SCNC: 21 MMOL/L (ref 22–29)
GFR SERPL CREATININE-BSD FRML MDRD: 77 ML/MIN/1.73M2
GLUCOSE SERPL-MCNC: 86 MG/DL (ref 70–99)
POTASSIUM SERPL-SCNC: 4.8 MMOL/L (ref 3.4–5.3)
SODIUM SERPL-SCNC: 141 MMOL/L (ref 136–145)

## 2022-12-30 RX ORDER — ATORVASTATIN CALCIUM 20 MG/1
20 TABLET, FILM COATED ORAL DAILY
Qty: 90 TABLET | Refills: 3 | Status: SHIPPED | OUTPATIENT
Start: 2022-12-30 | End: 2024-01-05

## 2022-12-31 ENCOUNTER — MYC MEDICAL ADVICE (OUTPATIENT)
Dept: FAMILY MEDICINE | Facility: CLINIC | Age: 62
End: 2022-12-31

## 2022-12-31 ENCOUNTER — TELEPHONE (OUTPATIENT)
Dept: FAMILY MEDICINE | Facility: CLINIC | Age: 62
End: 2022-12-31

## 2022-12-31 NOTE — TELEPHONE ENCOUNTER
Reason for Call:  Appointment Request    Patient requesting this type of appt: Chronic Diease Management/Medication/Follow-Up    Requested provider: Dr. Sybil Arellano    Reason patient unable to be scheduled: Not within requested timeframe    When does patient want to be seen/preferred time: Sometime in Mid March    Comments: Wanting to schedule for a 2 month follow up for Cholesterol check. No available appointments in that time.    Could we send this information to you in [employer] or would you prefer to receive a phone call?:   Patient would like to be contacted via [employer]    Call taken on 12/31/2022 at 1:56 PM by Juma Carmona

## 2023-01-02 NOTE — TELEPHONE ENCOUNTER
MEDICAL RECORDS REQUEST   [facility] Clinic  [address] [place] [address]  PHONE: [phone]  Fax: [phone]        FUTURE VISIT INFORMATION                                                   Bob Rodriguez, LIDIA: 1960 scheduled for future visit at [facility]    APPOINTMENT INFORMATION:    Date: 2023    Provider: Lan Alexandre MD    Reason for Visit/Diagnosis: Erectile dysfunction    REFERRAL INFORMATION:    Referring provider:  Sybil Arellano MD in  [facility]    RECORDS REQUESTED FOR VISIT                                                     NOTES  STATUS/DETAILS   OFFICE NOTE from referring provider  yes, 2022 -- Sybil Arellano MD in  [facility]   OFFICE NOTE from other specialist  yes, 2022, 2020 -- Sarina Dorman MD  @ [facility]    2020 -- Rachael Ventura MD  @ [facility]    More in EPIC   MEDICATION LIST  yes     PRE-VISIT CHECKLIST      Record collection complete Yes   Appointment appropriately scheduled           (right time/right provider) Yes   Joint diagnostic appointment coordinated correctly          (ensure right order & amount of time) Yes   MyChart activation Yes   Questionnaire complete If no, please explain pending

## 2023-01-03 NOTE — TELEPHONE ENCOUNTER
"Dr. Arellano-Please review response from patient and may close encounter.    \"Dr. Devine,  It was very nice to see you the other day in your office :)     I was not happy to hear about the high cholesterol and the high risk of heart disease from the lab results. Thanks for prescribing the meds to help reduce the cholesterol and I ll work on eating healthier and doing more exercising.  Hopefully I can get this under control quickly.      Thanks,  Bob\"    Thank you!  BRENDA KimN, RN-BC  [facility]    "

## 2023-01-04 NOTE — TELEPHONE ENCOUNTER
Scheduled with lab 2/28/23. Patient was unclear about follow up, but did discuss 2 month follow up was for labs only. Appt with PCP kept for April to discuss further if needed

## 2023-01-09 ENCOUNTER — TELEPHONE (OUTPATIENT)
Dept: FAMILY MEDICINE | Facility: CLINIC | Age: 63
End: 2023-01-09
Payer: COMMERCIAL

## 2023-01-09 NOTE — TELEPHONE ENCOUNTER
Please submit prior authorization through [facility] Prior authorization group for increased dispense per refill for: Tadalafil 2.5 MG Oral Tablet (CIALIS).      Thank you!  BRENDA KimN, RN-[facility]

## 2023-01-09 NOTE — TELEPHONE ENCOUNTER
Writer responded via [employer].    See 1/9/23 telephone encounter.    LEI Kim, RN-BC  [facility]

## 2023-01-14 NOTE — TELEPHONE ENCOUNTER
PRIOR AUTHORIZATION DENIED    Medication: Tadalafil 2.5 MG Oral Tablet (CIALIS)    Denial Date: 1/13/2023    Denial Rational:                   Appeal Information:    If you would like to appeal, please supply P/A team with a letter of medical necessity with clinical reason.

## 2023-01-19 ENCOUNTER — MYC MEDICAL ADVICE (OUTPATIENT)
Dept: FAMILY MEDICINE | Facility: CLINIC | Age: 63
End: 2023-01-19
Payer: COMMERCIAL

## 2023-01-19 NOTE — TELEPHONE ENCOUNTER
General Call    Contacts       Type Contact Phone/Fax    01/19/2023 03:46 PM CST Phone (Incoming) Bob Rodriguez (Self) [phone] (H)        Reason for Call:  Checking on status    What are your questions or concerns:  Wondering what the status of the prior auth is going and wondering how long it will take.    Date of last appointment with provider: 12/29/2022    Could we send this information to you in [employer] or would you prefer to receive a phone call?:   Patient would prefer a phone call   Okay to leave a detailed message?: Yes at Cell number on file:    Telephone Information:   Mobile [phone]

## 2023-01-20 NOTE — TELEPHONE ENCOUNTER
Left detailed message letting pt know that PA was denied, can purchase out of pocket.    BRENDA AbdullahiN RN  [facility]

## 2023-01-24 NOTE — TELEPHONE ENCOUNTER
Responded to patient via [employer].    Marylou Condon RN  [facility]    
comfortable appearance/cooperative

## 2023-01-26 ASSESSMENT — SLEEP AND FATIGUE QUESTIONNAIRES
HOW LIKELY ARE YOU TO NOD OFF OR FALL ASLEEP WHEN YOU ARE A PASSENGER IN A CAR FOR AN HOUR WITHOUT A BREAK: SLIGHT CHANCE OF DOZING
HOW LIKELY ARE YOU TO NOD OFF OR FALL ASLEEP WHILE SITTING QUIETLY AFTER LUNCH WITHOUT ALCOHOL: SLIGHT CHANCE OF DOZING
HOW LIKELY ARE YOU TO NOD OFF OR FALL ASLEEP WHILE SITTING INACTIVE IN A PUBLIC PLACE: WOULD NEVER DOZE
HOW LIKELY ARE YOU TO NOD OFF OR FALL ASLEEP WHILE SITTING AND READING: MODERATE CHANCE OF DOZING
HOW LIKELY ARE YOU TO NOD OFF OR FALL ASLEEP WHILE WATCHING TV: SLIGHT CHANCE OF DOZING
HOW LIKELY ARE YOU TO NOD OFF OR FALL ASLEEP WHILE SITTING AND TALKING TO SOMEONE: WOULD NEVER DOZE
HOW LIKELY ARE YOU TO NOD OFF OR FALL ASLEEP IN A CAR, WHILE STOPPED FOR A FEW MINUTES IN TRAFFIC: SLIGHT CHANCE OF DOZING
HOW LIKELY ARE YOU TO NOD OFF OR FALL ASLEEP WHILE LYING DOWN TO REST IN THE AFTERNOON WHEN CIRCUMSTANCES PERMIT: MODERATE CHANCE OF DOZING

## 2023-01-30 ENCOUNTER — VIRTUAL VISIT (OUTPATIENT)
Dept: SLEEP MEDICINE | Facility: CLINIC | Age: 63
End: 2023-01-30
Attending: FAMILY MEDICINE
Payer: COMMERCIAL

## 2023-01-30 VITALS — WEIGHT: 195 LBS | BODY MASS INDEX: 28.88 KG/M2 | HEIGHT: 69 IN

## 2023-01-30 DIAGNOSIS — R25.8 NOCTURNAL LEG MOVEMENTS: ICD-10-CM

## 2023-01-30 DIAGNOSIS — G25.81 RESTLESS LEGS SYNDROME (RLS): ICD-10-CM

## 2023-01-30 DIAGNOSIS — G47.00 INSOMNIA, UNSPECIFIED TYPE: ICD-10-CM

## 2023-01-30 DIAGNOSIS — R06.81 WITNESSED APNEIC SPELLS: Primary | ICD-10-CM

## 2023-01-30 DIAGNOSIS — R06.83 SNORING: ICD-10-CM

## 2023-01-30 PROCEDURE — 99204 OFFICE O/P NEW MOD 45 MIN: CPT | Mod: 95 | Performed by: PHYSICIAN ASSISTANT

## 2023-01-30 NOTE — PATIENT INSTRUCTIONS
"      MY TREATMENT INFORMATION FOR SLEEP APNEA-  Bob Rodriguez  Frequently asked questions:  1. What is Obstructive Sleep Apnea (SARITA)? SARITA is the most common type of sleep apnea. Apnea means, \"without breath.\"  Apnea is most often caused by narrowing or collapse of the upper airway as muscles relax during sleep.   Almost everyone has occasional apneas. Most people with sleep apnea have had brief interruptions at night frequently for many years.  The severity of sleep apnea is related to how frequent and severe the events are.   2. What are the consequences of SARITA? Symptoms include: feeling sleepy during the day, snoring loudly, gasping or stopping of breathing, trouble sleeping, and occasionally morning headaches or heartburn at night.  Sleepiness can be serious and even increase the risk of falling asleep while driving. Other health consequences may include development of high blood pressure and other cardiovascular disease in persons who are susceptible. Untreated SARITA  can contribute to heart disease, stroke and diabetes.   3. What are the treatment options? In most situations, sleep apnea is a lifelong disease that must be managed with daily therapy. Medications are not effective for sleep apnea and surgery is generally not considered until other therapies have been tried. Your treatment is your choice . Continuous Positive Airway (CPAP) works right away and is the therapy that is effective in nearly everyone. An oral device to hold your jaw forward is usually the next most reliable option. Other options include postioning devices (to keep you off your back), weight loss, and surgery including a tongue pacing device. There is more detail about some of these options below.  4. Are my sleep studies covered by insurance? Although we will request verification of coverage, we advise you also check in advance of the study to ensure there is coverage.    Important tips for those choosing CPAP and similar devices   Know " your equipment:  CPAP is continuous positive airway pressure that prevents obstructive sleep apnea by keeping the throat from collapsing while you are sleeping. In most cases, the device is  smart  and can slowly self-adjusts if your throat collapses and keeps a record every day of how well you are treated-this information is available to you and your care team.  BPAP is bilevel positive airway pressure that keeps your throat open and also assists each breath with a pressure boost to maintain adequate breathing.  Special kinds of BPAP are used in patients who have inadequate breathing from lung or heart disease. In most cases, the device is  smart  and can slowly self-adjusts to assist breathing. Like CPAP, the device keeps a record of how well you are treated.  Your mask is your connection to the device. You get to choose what feels most comfortable and the staff will help to make sure if fits. Here: are some examples of the different masks that are available:       Key points to remember on your journey with sleep apnea:  Sleep study.  PAP devices often need to be adjusted during a sleep study to show that they are effective and adjusted right.  Good tips to remember: Try wearing just the mask during a quiet time during the day so your body adapts to wearing it. A humidifier is recommended for comfort in most cases to prevent drying of your nose and throat. Allergy medication from your provider may help you if you are having nasal congestion.  Getting settled-in. It takes more than one night for most of us to get used to wearing a mask. Try wearing just the mask during a quiet time during the day so your body adapts to wearing it. A humidifier is recommended for comfort in most cases. Our team will work with you carefully on the first day and will be in contact within 4 days and again at 2 and 4 weeks for advice and remote device adjustments. Your therapy is evaluated by the device each day.   Use it every night.  The more you are able to sleep naturally for 7-8 hours, the more likely you will have good sleep and to prevent health risks or symptoms from sleep apnea. Even if you use it 4 hours it helps. Occasionally all of us are unable to use a medical therapy, in sleep apnea, it is not dangerous to miss one night.   Communicate. Call our skilled team on the number provided on the first day if your visit for problems that make it difficult to wear the device. Over 2 out of 3 patients can learn to wear the device long-term with help from our team. Remember to call our team or your sleep providers if you are unable to wear the device as we may have other solutions for those who cannot adapt to mask CPAP therapy. It is recommended that you sleep your sleep provider within the first 3 months and yearly after that if you are not having problems.   Use it for your health. We encourage use of CPAP masks during daytime quiet periods to allow your face and brain to adapt to the sensation of CPAP so that it will be a more natural sensation to awaken to at night or during naps. This can be very useful during the first few weeks or months of adapting to CPAP though it does not help medically to wear CPAP during wakefulness and  should not be used as a strategy just to meet guidelines.  Take care of your equipment. Make sure you clean your mask and tubing using directions every day and that your filter and mask are replaced as recommended or if they are not working.     BESIDES CPAP, WHAT OTHER THERAPIES ARE THERE?    Positioning Device  Positioning devices are generally used when sleep apnea is mild and only occurs on your back.This example shows a pillow that straps around the waist. It may be appropriate for those whose sleep study shows milder sleep apnea that occurs primarily when lying flat on one's back. Preliminary studies have shown benefit but effectiveness at home may need to be verified by a home sleep test. These devices are  generally not covered by medical insurance.  Examples of devices that maintain sleeping on the back to prevent snoring and mild sleep apnea.    Belt type body positioner  http://[employer].com/    Electronic reminder  http://nightshifttherapy.com/            Oral Appliance  What is oral appliance therapy?  An oral appliance device fits on your teeth at night like a retainer used after having braces. The device is made by a specialized dentist and requires several visits over 1-2 months before a manufactured device is made to fit your teeth and is adjusted to prevent your sleep apnea. Once an oral device is working properly, snoring should be improved. A home sleep test may be recommended at that time if to determine whether the sleep apnea is adequately treated.       Some things to remember:  -Oral devices are often, but not always, covered by your medical insurance. Be sure to check with your insurance provider.   -If you are referred for oral therapy, you will be given a list of specialized dentists to consider or you may choose to visit the Web site of the American Academy of Dental Sleep Medicine  -Oral devices are less likely to work if you have severe sleep apnea or are extremely overweight.     More detailed information  An oral appliance is a small acrylic device that fits over the upper and lower teeth  (similar to a retainer or a mouth guard). This device slightly moves jaw forward, which moves the base of the tongue forward, opens the airway, improves breathing for effective treat snoring and obstructive sleep apnea in perhaps 7 out of 10 people .  The best working devices are custom-made by a dental device  after a mold is made of the teeth 1, 2, 3.  When is an oral appliance indicated?  Oral appliance therapy is recommended as a first-line treatment for patients with primary snoring, mild sleep apnea, and for patients with moderate sleep apnea who prefer appliance therapy to use of CPAP4, 5.  Severity of sleep apnea is determined by sleep testing and is based on the number of respiratory events per hour of sleep.   How successful is oral appliance therapy?  The success rate of oral appliance therapy in patients with mild sleep apnea is 75-80% while in patients with moderate sleep apnea it is 50-70%. The chance of success in patients with severe sleep apnea is 40-50%. The research also shows that oral appliances have a beneficial effect on the cardiovascular health of SARITA patients at the same magnitude as CPAP therapy7.  Oral appliances should be a second-line treatment in cases of severe sleep apnea, but if not completely successful then a combination therapy utilizing CPAP plus oral appliance therapy may be effective. Oral appliances tend to be effective in a broad range of patients although studies show that the patients who have the highest success are females, younger patients, those with milder disease, and less severe obesity. 3, 6.   Finding a dentist that practices dental sleep medicine  Specific training is available through the American Academy of Dental Sleep Medicine for dentists interested in working in the field of sleep. To find a dentist who is educated in the field of sleep and the use of oral appliances, near you, visit the Web site of the American Academy of Dental Sleep Medicine.    References  1. Sofía, et al. Objectively measured vs self-reported compliance during oral appliance therapy for sleep-disordered breathing. Chest 2013; 144(5): 4730-9107.  2. Bradley et al. Objective measurement of compliance during oral appliance therapy for sleep-disordered breathing. Thorax 2013; 68(1): 91-96.  3. Kimberlyn, et al. Mandibular advancement devices in 620 men and women with SARITA and snoring: tolerability and predictors of treatment success. Chest 2004; 125: 5862-9001.  4. Ant et al. Oral appliances for snoring and SARITA: a review. Sleep 2006; 29: 244-262.  5. Nichole et al.  Oral appliance treatment for SARITA: an update. J Clin Sleep Med 2014; 10(2): 215-227.  6. Magdalena et al. Predictors of OSAH treatment outcome. J Dent Res 2007; 86: 6466-8059.      Weight Loss:    Weight loss is a long-term strategy that may improve sleep apnea in some patients.    Weight management is a personal decision and the decision should be based on your interest and the potential benefits.  If you are interested in exploring weight loss strategies, the following discussion covers the impact on weight loss on sleep apnea and the approaches that may be successful.    Being overweight does not necessarily mean you will have health consequences.  Those who have BMI over 35 or over 27 with existing medical conditions carries greater risk.   Weight loss decreases severity of sleep apnea in most people with obesity. For those with mild obesity who have developed snoring with weight gain, even 15-30 pound weight loss can improve and occasionally eliminate sleep apnea.  Structured and life-long dietary and health habits are necessary to lose weight and keep healthier weight levels.     Though there may be significant health benefits from weight loss, long-term weight loss is very difficult to achieve- studies show success with dietary management in less than 10% of people. In addition, substantial weight loss may require years of dietary control and may be difficult if patients have severe obesity. In these cases, surgical management may be considered.  Finally, older individuals who have tolerated obesity without health complications may be less likely to benefit from weight loss strategies.      [unfilled]    Surgery:    Surgery for obstructive sleep apnea is considered generally only when other therapies fail to work. Surgery may be discussed with you if you are having a difficult time tolerating CPAP and or when there is an abnormal structure that requires surgical correction.  Nose and throat surgeries often  enlarge the airway to prevent collapse.  Most of these surgeries create pain for 1-2 weeks and up to half of the most common surgeries are not effective throughout life.  You should carefully discuss the benefits and drawbacks to surgery with your sleep provider and surgeon to determine if it is the best solution for you.   More information  Surgery for SARITA is directed at areas that are responsible for narrowing or complete obstruction of the airway during sleep.  There are a wide range of procedures available to enlarge and/or stabilize the airway to prevent blockage of breathing in the three major areas where it can occur: the palate, tongue, and nasal regions.  Successful surgical treatment depends on the accurate identification of the factors responsible for obstructive sleep apnea in each person.  A personalized approach is required because there is no single treatment that works well for everyone.  Because of anatomic variation, consultation with an examination by a sleep surgeon is a critical first step in determining what surgical options are best for each patient.  In some cases, examination during sedation may be recommended in order to guide the selection of procedures.  Patients will be counseled about risks and benefits as well as the typical recovery course after surgery. Surgery is typically not a cure for a person s SARITA.  However, surgery will often significantly improve one s SARITA severity (termed  success rate ).  Even in the absence of a cure, surgery will decrease the cardiovascular risk associated with OSA7; improve overall quality of life8 (sleepiness, functionality, sleep quality, etc).      Palate Procedures:  Patients with SARITA often have narrowing of their airway in the region of their tonsils and uvula.  The goals of palate procedures are to widen the airway in this region as well as to help the tissues resist collapse.  Modern palate procedure techniques focus on tissue conservation and  soft tissue rearrangement, rather than tissue removal.  Often the uvula is preserved in this procedure. Residual sleep apnea is common in patient after pharyngoplasty with an average reduction in sleep apnea events of 33%2.      Tongue Procedures:  ExamWhile patients are awake, the muscles that surround the throat are active and keep this region open for breathing. These muscles relax during sleep, allowing the tongue and other structures to collapse and block breathing.  There are several different tongue procedures available.  Selection of a tongue base procedure depends on characteristics seen on physical exam.  Generally, procedures are aimed at removing bulky tissues in this area or preventing the back of the tongue from falling back during sleep.  Success rates for tongue surgery range from 50-62%3.    Hypoglossal Nerve Stimulation:  Hypoglossal nerve stimulation has recently received approval from the [place] Food and Drug Administration for the treatment of obstructive sleep apnea.  This is based on research showing that the system was safe and effective in treating sleep apnea6.  Results showed that the median AHI score decreased 68%, from 29.3 to 9.0. This therapy uses an implant system that senses breathing patterns and delivers mild stimulation to airway muscles, which keeps the airway open during sleep.  The system consists of three fully implanted components: a small generator (similar in size to a pacemaker), a breathing sensor, and a stimulation lead.  Using a small handheld remote, a patient turns the therapy on before bed and off upon awakening.    Candidates for this device must be greater than 18 years of age, have moderate to severe SARITA (AHI between 15-65), BMI less than 35, have tried CPAP/oral appliance for at least 8 weeks without success, and have appropriate upper airway anatomy (determined by a sleep endoscopy performed by Dr. Pepe West).    Hypoglossal Nerve Stimulation  Pathway:    The sleep surgeon s office will work with the patient through the insurance prior-authorization process (including communications and appeals).    Nasal Procedures:  Nasal obstruction can interfere with nasal breathing during the day and night.  Studies have shown that relief of nasal obstruction can improve the ability of some patients to tolerate positive airway pressure therapy for obstructive sleep apnea1.  Treatment options include medications such as nasal saline, topical corticosteroid and antihistamine sprays, and oral medications such as antihistamines or decongestants. Non-surgical treatments can include external nasal dilators for selected patients. If these are not successful by themselves, surgery can improve the nasal airway either alone or in combination with these other options.      Combination Procedures:  Combination of surgical procedures and other treatments may be recommended, particularly if patients have more than one area of narrowing or persistent positional disease.  The success rate of combination surgery ranges from 66-80%2,3.    References  Sienna OVIEDO. The Role of the Nose in Snoring and Obstructive Sleep Apnoea: An Update.  Eur Arch Otorhinolaryngol. 2011; 268: 1365-73.   Inocenet SM; Bryce JA; Terri JR; Pallanch JF; Jose A MB; Valencia SG; Sophie BEY. Surgical modifications of the upper airway for obstructive sleep apnea in adults: a systematic review and meta-analysis. SLEEP 2010;33(10):8845-1309. Bryn MORRISON. Hypopharyngeal surgery in obstructive sleep apnea: an evidence-based medicine review.  Arch Otolaryngol Head Neck Surg. 2006 Feb;132(2):206-13.  Shaka YH1, Shreya Y, Alexey CIARA. The efficacy of anatomically based multilevel surgery for obstructive sleep apnea. Otolaryngol Head Neck Surg. 2003 Oct;129(4):327-35.  Kezirian E, Goldberg A. Hypopharyngeal Surgery in Obstructive Sleep Apnea: An Evidence-Based Medicine Review. Arch Otolaryngol Head Neck Surg. 2006  Feb;132(2):206-13.  Yannick PJ et al. Upper-Airway Stimulation for Obstructive Sleep Apnea.  N Engl J Med. 2014 Jan 9;370(2):139-49.  Maria G Y et al. Increased Incidence of Cardiovascular Disease in Middle-aged Men with Obstructive Sleep Apnea. Am J Respir Crit Care Med; 2002 166: 159-165  Pascual EM et al. Studying Life Effects and Effectiveness of Palatopharyngoplasty (SLEEP) study: Subjective Outcomes of Isolated Uvulopalatopharyngoplasty. Otolaryngol Head Neck Surg. 2011; 144: 623-631.        WHAT IF I ONLY HAVE SNORING?    Mandibular advancement devices, lateral sleep positioning, long-term weight loss and treatment of nasal allergies have been shown to improve snoring.  Exercising tongue muscles with a game ([email or web address]) or stimulating the tongue during the day with a device (https://doi.org/10.3390/qhm47537438) have improved snoring in some individuals.    Remember to Drive Safe... Drive Alive     Sleep health profoundly affects your health, mood, and your safety.  Thirty three percent of the population (one in three of us) is not getting enough sleep and many have a sleep disorder. Not getting enough sleep or having an untreated / undertreated sleep condition may make us sleepy without even knowing it. In fact, our driving could be dramatically impaired due to our sleep health. As your provider, here are some things I would like you to know about driving:     Here are some warning signs for impairment and dangerous drowsy driving:              -Having been awake more than 16 hours               -Looking tired               -Eyelid drooping              -Head nodding (it could be too late at this point)              -Driving for more than 30 minutes     Some things you could do to make the driving safer if you are experiencing some drowsiness:              -Stop driving and rest              -Call for transportation              -Make sure your sleep  disorder is adequately treated     Some things that have been shown NOT to work when experiencing drowsiness while driving:              -Turning on the radio              -Opening windows              -Eating any  distracting  /  entertaining  foods (e.g., sunflower seeds, candy, or any other)              -Talking on the phone      Your decision may not only impact your life, but also the life of others. Please, remember to drive safe for yourself and all of us.

## 2023-01-30 NOTE — PROGRESS NOTES
Bob is a 62 year old who is being evaluated via a billable video visit.      How would you like to obtain your AVS? MyChart  If the video visit is dropped, the invitation should be resent by: Send to e-mail at: [email or web address]  Will anyone else be joining your video visit? No        Video-Visit Details    Type of service:  Video Visit   Video visit start time: 8:05AM  Video visit end time: 8:42AM  Originating Location (pt. Location): Home    Distant Location (provider location):  Off-site  Platform used for Video Visit: [facility]       Outpatient Sleep Medicine Consultation:      Name: Bob Rodriguez MRN# [id number]   Age: 62 year old YOB: 1960     Date of Consultation: January 30, 2023  Consultation is requested by: Sybil Arellano MD  [address]  [place]  [place] [address] ySbil Arellano  Primary care provider: Sybil Arellano       Reason for Sleep Consult:     Bob Rodriguez is sent by Sybil Arellano for a sleep consultation regarding snoring.    Patient s Reason for visit  Bob Rodriguez main reason for visit: Problems staying asleep, periodic limb movements, occasional snoring  Patient states problem(s) started: Last year  Bob Rodriguez's goals for this visit: To understand options to get more restful sleep           Assessment and Plan:     1. Snoring  2. Witnessed apneic spells  3. Restless legs syndrome (RLS)  4. Nocturnal leg movements  5. Insomnia, unspecified type  Patient presents to clinic today with snoring, witnessed apneic events by wife, occasional RLS, leg movements in sleep concerning for PLM's, and insomnia concerns.  Managing his insomnia right now with trazodone with decent benefit, discussed some sleep hygiene changes he can make today and in particular to avoid screens/stop watching TV in the middle of the night and right before bed.  Discussed that untreated sleep apnea and/or leg movements likely also playing a role.  We agreed to pursue in lab polysomnogram  for evaluation of the above.  Home sleep apnea testing is inappropriate for this patient given additional concern of abnormal movements in sleep. We discussed CPAP and oral appliance therapy in the event sleep apnea seen on study, appeared open to either today, will discuss in detail at next visit pending results.  We will plan to see him back 1-2 weeks after study for results discussion.   - Comprehensive Sleep Study; Future         History of Present Illness:     Bob Rodriguez is a 62-year-old male with obesity, ADHD on Adderall, insomnia on trazodone, BPH who presents to clinic today with his wife Oksana on video visit for evaluation of snoring and insomnia.    SLEEP-WAKE SCHEDULE:     Work/School Days: Patient goes to school/work: Yes -  at [employer]  Usually gets into bed at Midnight  Takes patient about 15-30 minutes to fall asleep (with trazodone)  Has trouble falling asleep 2 nights per week  Wakes up in the middle of the night 2 times   Wakes up due to External stimuli (bed partner, pets, noise, etc);Use the bathroom  He has trouble falling back asleep 2-3 times a week.   It usually takes 30 minutes to get back to sleep  Patient is usually up at 7am  Uses alarm: Yes    Weekends/Non-work Days/All Other Days:  Usually gets into bed at 1am   Takes patient about 30 minutes to fall asleep  Patient is usually up at 9am  Uses alarm: No    Sleep Need  Patient estimates he gets 5 hours sleep on average   Patient thinks he needs about 7 hours sleep    Bob Rodriguez prefers to sleep in this position(s): Side;Stomach   Patient states they do the following activities in bed: Read;Eat;Watch TV;Use phone, computer, or tablet    Naps  Patient takes a purposeful nap 2 times a week (typically on weekends) and naps are usually 30-45minutes in duration  He feels better after a nap: Yes  He dozes off unintentionally 2 days per week  Patient has had a driving accident or near-miss due to sleepiness/drowsiness:  "No      SLEEP DISRUPTIONS:    Breathing/Snoring  Patient snores:Yes  Other people complain about his snoring: Yes   Patient has been told he stops breathing in his sleep:Yes  He has issues with the following: Stuffy nose when you wake up;Getting up to urinate more than once    Movement:  Reports occasional restless legs syndrome symptoms - describes as \"it's more creepy crawlies in my legs\", occurs 1-2 times per week, doesn't interfere with sleep latency  Patient has been told he kicks his legs at night:  Yes - Per wife \"he will raise his legs up and down hard and it's a repetitive thing and it wakes me up\", for past 5 years      Behaviors in Sleep:  Bob Rodriguez has experienced the following behaviors while sleeping: Sleep-talking;Teeth grinding;Kicking  He has experienced sudden muscle weakness during the day: No      Is there anything else you would like your sleep provider to know: I take trazodone to help get to sleep      CAFFEINE AND OTHER SUBSTANCES:    Patient consumes caffeinated beverages per day:  2  Last caffeine use is usually: Early afternoon  List of any prescribed or over the counter stimulants that patient takes: Adderall 30MG  List of any prescribed or over the counter sleep medication patient takes: Trazodone  Patient drinks alcohol to help them sleep: No  Patient drinks alcohol near bedtime: No    Family History:  Patient has a family member been diagnosed with a sleep disorder: No    SCALES:    EPWORTH SLEEPINESS SCALE      Tutor Key Sleepiness Scale ( ROBERT Chapin  9307-1061<br>ESS - [place]/English - Final version - 21 Nov 07 - [facility] [place].) 1/26/2023   Sitting and reading Moderate chance of dozing   Watching TV Slight chance of dozing   Sitting, inactive in a public place (e.g. a theatre or a meeting) Would never doze   As a passenger in a car for an hour without a break Slight chance of dozing   Lying down to rest in the afternoon when circumstances permit Moderate chance of dozing "   Sitting and talking to someone Would never doze   Sitting quietly after a lunch without alcohol Slight chance of dozing   In a car, while stopped for a few minutes in traffic Slight chance of dozing   [place] Score (MC) 8   [place] Score (Sleep) 8         INSOMNIA SEVERITY INDEX (BASSEM)      Insomnia Severity Index (BASSEM) 1/26/2023   Difficulty falling asleep 2   Difficulty staying asleep 4   Problems waking up too early 2   How SATISFIED/DISSATISFIED are you with your CURRENT sleep pattern? 3   How NOTICEABLE to others do you think your sleep problem is in terms of impairing the quality of your life? 2   How WORRIED/DISTRESSED are you about your current sleep problem? 3   To what extent do you consider your sleep problem to INTERFERE with your daily functioning (e.g. daytime fatigue, mood, ability to function at work/daily chores, concentration, memory, mood, etc.) CURRENTLY? 3   BASSEM Total Score 19       Guidelines for Scoring/Interpretation:  Total score categories:  0-7 = No clinically significant insomnia   8-14 = Subthreshold insomnia   15-21 = Clinical insomnia (moderate severity)  22-28 = Clinical insomnia (severe)  Used via courtesy of www.[employer].va.gov with permission from Oswald Garcia PhD., [facility]      Allergies:    Allergies   Allergen Reactions     No Known Allergies        Medications:    Current Outpatient Medications   Medication Sig Dispense Refill     amphetamine-dextroamphetamine (ADDERALL) 30 MG tablet Take 1.5 tablets by mouth daily       atorvastatin (LIPITOR) 20 MG tablet Take 1 tablet (20 mg) by mouth daily 90 tablet 3     sildenafil (VIAGRA) 100 MG tablet Take 1 tablet by mouth daily       tadalafil (CIALIS) 2.5 MG tablet Take 1 tablet (2.5 mg) by mouth daily 90 tablet 1     traZODone (DESYREL) 100 MG tablet Take 1 tablet (100 mg) by mouth At Bedtime 90 tablet 3       Problem List:  Patient Active Problem List    Diagnosis Date Noted     Insomnia, unspecified type 10/25/2022      Priority: Medium     Chronic pain of left knee 10/25/2022     Priority: Medium     Family history of squamous cell carcinoma 10/25/2022     Priority: Medium     Snoring 10/25/2022     Priority: Medium     Low vitamin B12 level 05/01/2022     Priority: Medium     Lumbar back pain 03/25/2022     Priority: Medium     Elevated fasting blood sugar 10/12/2021     Priority: Medium     Hypercholesterolemia 09/07/2020     Priority: Medium     12/2022: ASCVD score 10%  Start statin  Recheck 2 months       Benign prostatic hyperplasia with lower urinary tract symptoms 08/09/2018     Priority: Medium     Seborrheic keratoses 08/04/2017     Priority: Medium     Atypical pigmented skin lesion 07/21/2017     Priority: Medium     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 04/06/2017     Priority: Medium     ADHD (attention deficit hyperactivity disorder), inattentive type 04/01/2014     Priority: Medium     2022: managed through psych NP       Tubular adenoma 10/09/2013     Priority: Medium     Thrombosed external hemorrhoid 09/10/2013     Priority: Medium     Degenerative arthritis of knee 11/15/2012     Priority: Medium     Erectile dysfunction 10/23/2012     Priority: Medium        Past Medical/Surgical History:  Past Medical History:   Diagnosis Date     Degenerative arthritis of knee 11/15/2012     Past Surgical History:   Procedure Laterality Date     COLONOSCOPY  03/01/2019     ORTHOPEDIC SURGERY  2004       Social History:  Social History     Socioeconomic History     Marital status:      Spouse name: Not on file     Number of children: Not on file     Years of education: Not on file     Highest education level: Not on file   Occupational History     Not on file   Tobacco Use     Smoking status: Never     Smokeless tobacco: Never   Vaping Use     Vaping Use: Never used   Substance and Sexual Activity     Alcohol use: Yes     Comment: infrequent drinker - one glass per week     Drug use: Never     Sexual  "activity: Yes     Partners: Female     Birth control/protection: Male Surgical   Other Topics Concern     Parent/sibling w/ CABG, MI or angioplasty before 65F 55M? No   Social History Narrative     Not on file     Social Determinants of Health     Financial Resource Strain: Not on file   Food Insecurity: Not on file   Transportation Needs: Not on file   Physical Activity: Not on file   Stress: Not on file   Social Connections: Not on file   Intimate Partner Violence: Not on file   Housing Stability: Not on file       Family History:  Family History   Problem Relation Age of Onset     Other Cancer Father         Father passed away from skin cancer     Review of Systems:  Fevers: No  Night Sweats: No  Weight Gain: No  Pain at Night: No  Double Vision: No  Changes in Vision: No  Difficulty Breathing through Nose: Yes  Sore Throat in Morning: No  Dry Mouth in the Morning: Yes  Shortness of Breath Lying Flat: No  Shortness of Breath With Activity: Yes  Awakening with Shortness of Breath: No  Increased Cough: No  Heart Racing at Night: No  Swelling in Feet or Legs: No  Diarrhea at Night: No  Heartburn at Night: No  Urinating More than Once at Night: Yes  Losing Control of Urine at Night: No  Joint Pains at Night: No  Headaches in Morning: No  Weakness in Arms or Legs: No  Depressed Mood: No  Anxiety: No     Physical Examination:  Vitals: Ht 1.753 m (5' 9\")   Wt 88.5 kg (195 lb)   BMI 28.80 kg/m    BMI= Body mass index is 28.8 kg/m .  General appearance: Awake, alert, cooperative. Well groomed. Sitting comfortably in chair. In no apparent distress.  HEENT: Head: Normocephalic, atraumatic. Eyes:Conjunctiva clear. Sclera normal. Nose: External appearance without deformity.   Pulmonary:  Able to speak easily in full sentences. No cough or wheeze.   Skin:  No rashes or significant lesions on visible skin.   Neurologic: Alert, oriented x3.   Psychiatric: Mood euthymic. Affect congruent with full range and intensity.         " Data: All pertinent previous laboratory data reviewed     Recent Labs   Lab Test 12/29/22  0930      POTASSIUM 4.8   CHLORIDE 105   CO2 21*   ANIONGAP 15   GLC 86   BUN 18.9   CR 1.10   DAVID 9.8       No results for input(s): WBC, RBC, HGB, HCT, MCV, MCH, MCHC, RDW, PLT in the last 09063 hours.    No results for input(s): PROTTOTAL, ALBUMIN, BILITOTAL, ALKPHOS, AST, ALT, BILIDIRECT in the last 21280 hours.    No results found for: TSH    No results found for: UAMP, UBARB, BENZODIAZEUR, UCANN, UCOC, OPIT, UPCP    No results found for: IRONSAT, AQ37502, JANE    No results found for: PH, PHARTERIAL, PO2, [id number], SAT, PCO2, HCO3, BASEEXCESS, ALVARO, BEB    @LABRCNTIPR(phv:4,pco2v:4,po2v:4,hco3v:4,song:4,o2per:4)@    Echocardiology: No results found for this or any previous visit (from the past 4320 hour(s)).    Chest x-ray: No results found for this or any previous visit from the past 365 days.      Chest CT: No results found for this or any previous visit from the past 365 days.      PFT: Most Recent [facility] Pulmonary Function Testing    No results found for: 20001  No results found for: 20002  No results found for: 20003  No results found for: 20015  No results found for: 20016  No results found for: 20027  No results found for: 20028  No results found for: 20029  No results found for: 20079  No results found for: 20080  No results found for: 20081  No results found for: 20335  No results found for: 20105  No results found for: 20053  No results found for: 20054  No results found for: 20055      Nicolasa Beckwith PA-C 1/30/2023     [facility]  [address], [place] [address]     [facility]  [address], [place] [address]    Chart documentation was completed, in part, with [employer] voice-recognition software. Even though reviewed, some grammatical, spelling, and word errors may remain.    54 minutes spent on day of encounter  reviewing medical records, history and physical examination, review of previous testing and interpretation, documentation and further activities as noted above

## 2023-02-01 ENCOUNTER — MYC MEDICAL ADVICE (OUTPATIENT)
Dept: FAMILY MEDICINE | Facility: CLINIC | Age: 63
End: 2023-02-01
Payer: COMMERCIAL

## 2023-02-01 DIAGNOSIS — N52.9 ERECTILE DYSFUNCTION, UNSPECIFIED ERECTILE DYSFUNCTION TYPE: Primary | ICD-10-CM

## 2023-02-02 RX ORDER — TADALAFIL 20 MG/1
20 TABLET ORAL DAILY PRN
Qty: 14 TABLET | Refills: 3 | Status: SHIPPED | OUTPATIENT
Start: 2023-02-02 | End: 2023-03-16

## 2023-02-02 NOTE — TELEPHONE ENCOUNTER
Dr. Arellano --    Please review and advise.   Pended Rx below.     Patient message from Daniele:     I would like to have my prescription for Tadalafil changed from a daily 2.5MG tablet to the 20MG tablet that would only be taken as needed before sexual activity.      My insurance has a quantity limitation on Tadalafil so even though you prescribed the 2.5MG for 90 days, the insurance ([employer]) only will allow 14 tablets so I m not able to take the pill on a daily basis as needed.      The higher 20MG dosage taken as needed would not require as many pills each month so would be covered under my insurance.      Please let me know if you can call in the prescription for the higher occasional use 20MG dosage of Tadalafil (cialis).     BRENDA SahuN DICKSON  [facility]

## 2023-02-08 NOTE — TELEPHONE ENCOUNTER
Writer responded via [facility].    BRENDA KimN, RN-BC  [facility]

## 2023-02-28 ENCOUNTER — LAB (OUTPATIENT)
Dept: LAB | Facility: CLINIC | Age: 63
End: 2023-02-28
Payer: COMMERCIAL

## 2023-02-28 DIAGNOSIS — E78.00 HYPERCHOLESTEROLEMIA: ICD-10-CM

## 2023-02-28 DIAGNOSIS — R73.01 ELEVATED FASTING BLOOD SUGAR: Primary | ICD-10-CM

## 2023-02-28 LAB — HBA1C MFR BLD: 5.2 % (ref 0–5.6)

## 2023-02-28 PROCEDURE — 80061 LIPID PANEL: CPT

## 2023-02-28 PROCEDURE — 83036 HEMOGLOBIN GLYCOSYLATED A1C: CPT

## 2023-02-28 PROCEDURE — 36415 COLL VENOUS BLD VENIPUNCTURE: CPT

## 2023-03-01 LAB
CHOLEST SERPL-MCNC: 174 MG/DL
HDLC SERPL-MCNC: 53 MG/DL
LDLC SERPL CALC-MCNC: 107 MG/DL
NONHDLC SERPL-MCNC: 121 MG/DL
TRIGL SERPL-MCNC: 69 MG/DL

## 2023-03-16 ENCOUNTER — OFFICE VISIT (OUTPATIENT)
Dept: UROLOGY | Facility: CLINIC | Age: 63
End: 2023-03-16
Attending: FAMILY MEDICINE
Payer: COMMERCIAL

## 2023-03-16 ENCOUNTER — PRE VISIT (OUTPATIENT)
Dept: UROLOGY | Facility: CLINIC | Age: 63
End: 2023-03-16

## 2023-03-16 ENCOUNTER — APPOINTMENT (OUTPATIENT)
Dept: LAB | Facility: CLINIC | Age: 63
End: 2023-03-16
Payer: COMMERCIAL

## 2023-03-16 VITALS
OXYGEN SATURATION: 96 % | BODY MASS INDEX: 28.88 KG/M2 | WEIGHT: 195 LBS | HEART RATE: 67 BPM | DIASTOLIC BLOOD PRESSURE: 71 MMHG | HEIGHT: 69 IN | SYSTOLIC BLOOD PRESSURE: 110 MMHG

## 2023-03-16 DIAGNOSIS — N52.9 ERECTILE DYSFUNCTION, UNSPECIFIED ERECTILE DYSFUNCTION TYPE: ICD-10-CM

## 2023-03-16 LAB
LH SERPL-ACNC: 6.4 MIU/ML (ref 1.7–8.6)
PROLACTIN SERPL 3RD IS-MCNC: 16 NG/ML (ref 4–15)
SHBG SERPL-SCNC: 44 NMOL/L (ref 11–80)

## 2023-03-16 PROCEDURE — 84403 ASSAY OF TOTAL TESTOSTERONE: CPT | Performed by: PATHOLOGY

## 2023-03-16 PROCEDURE — 99204 OFFICE O/P NEW MOD 45 MIN: CPT | Performed by: UROLOGY

## 2023-03-16 PROCEDURE — 99000 SPECIMEN HANDLING OFFICE-LAB: CPT | Performed by: PATHOLOGY

## 2023-03-16 PROCEDURE — 36415 COLL VENOUS BLD VENIPUNCTURE: CPT | Performed by: PATHOLOGY

## 2023-03-16 PROCEDURE — 84270 ASSAY OF SEX HORMONE GLOBUL: CPT | Performed by: PATHOLOGY

## 2023-03-16 PROCEDURE — 84443 ASSAY THYROID STIM HORMONE: CPT | Performed by: PATHOLOGY

## 2023-03-16 PROCEDURE — 83002 ASSAY OF GONADOTROPIN (LH): CPT | Performed by: PATHOLOGY

## 2023-03-16 PROCEDURE — 84146 ASSAY OF PROLACTIN: CPT | Performed by: PATHOLOGY

## 2023-03-16 RX ORDER — NIRMATRELVIR AND RITONAVIR 300-100 MG
3 KIT ORAL
COMMUNITY
Start: 2022-12-19 | End: 2023-03-16

## 2023-03-16 RX ORDER — PILOCARPINE HYDROCHLORIDE 12.5 MG/ML
SOLUTION/ DROPS OPHTHALMIC
COMMUNITY
Start: 2022-04-15 | End: 2023-05-08

## 2023-03-16 RX ORDER — TADALAFIL 20 MG/1
20 TABLET ORAL DAILY PRN
Qty: 30 TABLET | Refills: 11 | Status: SHIPPED | OUTPATIENT
Start: 2023-03-16 | End: 2024-02-23

## 2023-03-16 ASSESSMENT — PAIN SCALES - GENERAL: PAINLEVEL: NO PAIN (0)

## 2023-03-16 NOTE — LETTER
3/16/2023       RE: Bob Rodriguez  [address]  [place] [address]     Dear Colleague,    Thank you for referring your patient, Bob Rodriguez, to the [facility] [place] at [facility]. Please see a copy of my visit note below.    I am seeing Bob Rodriguez in consultation from Sybil Webb for evaluation of erectile dysfunction.    HPI:  Bob Rodriguez is a 62 year old male is a very nice man with complaints of erectile dysfunction x 5-6 years, but worse in the last year or so.  Started PDE5i in his mid 50's.  Worked great at first. Sildenafil not very effective at top dose.  Also tried tadalafil 2.5mg dose.    Trouble both getting and keeping.    ED/Vascular disease risk factors:  HTN:   No  Hyperlipidemia: yes, treated.   Smoking: no    DM: no  Cardiovascular disease: None known  Meds associated with ED that he's taking: none  Anxiety/anger/depression:  No  Penile Plaques or curvature:  None.  Has not had testosterone check.       PAST MEDICAL HX:  Past Medical History:   Diagnosis Date     Degenerative arthritis of knee 11/15/2012       PAST SURG HX:  Past Surgical History:   Procedure Laterality Date     COLONOSCOPY  03/01/2019     ORTHOPEDIC SURGERY  2004        FAMILY HX:  Family History   Problem Relation Age of Onset     Other Cancer Father         Father passed away from skin cancer       SOCIAL HX:  Social History     Tobacco Use     Smoking status: Never     Smokeless tobacco: Never   Vaping Use     Vaping Use: Never used   Substance Use Topics     Alcohol use: Yes     Comment: infrequent drinker - one glass per week     Drug use: Never       MEDICATIONS:  Current Outpatient Medications   Medication Sig     amphetamine-dextroamphetamine (ADDERALL) 30 MG tablet Take 1.5 tablets by mouth daily     atorvastatin (LIPITOR) 20 MG tablet Take 1 tablet (20 mg) by mouth daily     PAXLOVID, 300/100, 20 x 150 MG & 10 x  "100MG therapy pack Take 3 tablets by mouth 2 times daily     tadalafil (CIALIS) 2.5 MG tablet Take 1 tablet (2.5 mg) by mouth daily     traZODone (DESYREL) 100 MG tablet Take 1 tablet (100 mg) by mouth At Bedtime     VUITY 1.25 % SOLN INSTILL 1 DROP IN BOTH EYES 1 TIME IN THE MORNING     No current facility-administered medications for this visit.       ALLERGIES:  No known allergies      GENERAL PHYSICAL EXAM:     /71 (BP Location: Right arm, Patient Position: Sitting, Cuff Size: Adult Regular)   Pulse 67   Ht 1.753 m (5' 9\")   Wt 88.5 kg (195 lb)   SpO2 96%   BMI 28.80 kg/m     Constitutional: No acute distress. Well nourished.   PSYCH: normal mood and affect.  NEURO: normal gait, no focal deficits.   EYES: anicteric, EOMI, PERR.  CARDIOPULMONARY: breathing non-labored, pulse regular rate/rhythm, no peripheral edema.  GI: Abdomen soft, non-tender, nondistended  MUSCULOSKELETAL: normal limb proportions, no muscle wasting, no contractures.  SKIN: Normal virilized hair distribution, no lesions, warts or rashes over genitalia, abdomen extremities or face.  HEME/LYMPH: no ecchymosis, no lymphadenopathy in groin, no lymphedema.     EXAM:  Phallus  circumcised, meatus adequate, no plaques palpated.   Left testis descended , size is 20 , consistency is normal. No intra-testicular masses.   Right testis descended , size is 20 , consistency is normal . No intra-testicular masses.   Epididymes present, non-tender, not-enlarged.   Cord structures not remarkable.  SHENG deferred     Imaging/labs:  Lab Results   Component Value Date    CR 1.10 12/29/2022       Lab Results   Component Value Date    PSA 0.48 12/29/2022     Lipids did improve on statin:        ASSESSMENT:   Erectile dysfunction- likely organic    PLAN:    Testosterone, LH, PRL today    He had tried 2.5mg Cialis prescription in the past.  He would like to try 20mg dose.  Prescription placed for 20mg tadalafil.  Discussed side effects and how to " take.      I discussed that ED is a strong predictor for cardiovascular disease and cardiac events, and I am concerned that he could be at risk for a cardiac event in the future.    I advised he continue optimizing correctable risk factors for vascular disease where applicable (lipids) and consider a referral to preventative cardiology.    We discussed the possible utility of a penile duplex ultrasound.  While this would not change treatment options for ED, this could lend support to a diagnosis of inflow disease versus venous leak. He declines the PDUS.    Discussed [facility] referral - preventative cards- he would like to do this. Referral placed.    The risks of injection therapy, including pain, hematomas , PRIAPISM, and penile scar were explained.     He will let us know if interested in intracavernosal injection teaching/trial.      Copied cc to Consulting provider Dr. Arellano         Thank-you for the kind consultation.  Lan Alexandre MD     Urological Surgeon        Additional Coding Information:    Problems:  4 -- one or more chronic illnesses with exacerbation or side effects    Data Reviewed  3 or more studies reviewed, as listed above.   Normal PSA  Lipids improve on statin medication.    Tests ordered/pending: 3 labs ordered     Level of risk:  4 -- prescription drug management    Time spent:  24 minutes spent on the date of the encounter doing chart review, history and exam, documentation and further activities per the note    Sincerely,    Lan Alexandre MD

## 2023-03-16 NOTE — PROGRESS NOTES
I am seeing Bob Rodriguez in consultation from Sybil Webb for evaluation of erectile dysfunction.    HPI:  Bob Rodriguez is a 62 year old male is a very nice man with complaints of erectile dysfunction x 5-6 years, but worse in the last year or so.  Started PDE5i in his mid 50's.  Worked great at first. Sildenafil not very effective at top dose.  Also tried tadalafil 2.5mg dose.    Trouble both getting and keeping.    ED/Vascular disease risk factors:  HTN:   No  Hyperlipidemia: yes, treated.   Smoking: no    DM: no  Cardiovascular disease: None known  Meds associated with ED that he's taking: none  Anxiety/anger/depression:  No  Penile Plaques or curvature:  None.  Has not had testosterone check.       PAST MEDICAL HX:  Past Medical History:   Diagnosis Date     Degenerative arthritis of knee 11/15/2012       PAST SURG HX:  Past Surgical History:   Procedure Laterality Date     COLONOSCOPY  03/01/2019     ORTHOPEDIC SURGERY  2004        FAMILY HX:  Family History   Problem Relation Age of Onset     Other Cancer Father         Father passed away from skin cancer       SOCIAL HX:  Social History     Tobacco Use     Smoking status: Never     Smokeless tobacco: Never   Vaping Use     Vaping Use: Never used   Substance Use Topics     Alcohol use: Yes     Comment: infrequent drinker - one glass per week     Drug use: Never       MEDICATIONS:  Current Outpatient Medications   Medication Sig     amphetamine-dextroamphetamine (ADDERALL) 30 MG tablet Take 1.5 tablets by mouth daily     atorvastatin (LIPITOR) 20 MG tablet Take 1 tablet (20 mg) by mouth daily     PAXLOVID, 300/100, 20 x 150 MG & 10 x 100MG therapy pack Take 3 tablets by mouth 2 times daily     tadalafil (CIALIS) 2.5 MG tablet Take 1 tablet (2.5 mg) by mouth daily     traZODone (DESYREL) 100 MG tablet Take 1 tablet (100 mg) by mouth At Bedtime     VUITY 1.25 % SOLN INSTILL 1 DROP IN BOTH EYES 1 TIME IN THE MORNING     No current facility-administered  "medications for this visit.       ALLERGIES:  No known allergies      GENERAL PHYSICAL EXAM:     /71 (BP Location: Right arm, Patient Position: Sitting, Cuff Size: Adult Regular)   Pulse 67   Ht 1.753 m (5' 9\")   Wt 88.5 kg (195 lb)   SpO2 96%   BMI 28.80 kg/m     Constitutional: No acute distress. Well nourished.   PSYCH: normal mood and affect.  NEURO: normal gait, no focal deficits.   EYES: anicteric, EOMI, PERR.  CARDIOPULMONARY: breathing non-labored, pulse regular rate/rhythm, no peripheral edema.  GI: Abdomen soft, non-tender, nondistended  MUSCULOSKELETAL: normal limb proportions, no muscle wasting, no contractures.  SKIN: Normal virilized hair distribution, no lesions, warts or rashes over genitalia, abdomen extremities or face.  HEME/LYMPH: no ecchymosis, no lymphadenopathy in groin, no lymphedema.     EXAM:  Phallus  circumcised, meatus adequate, no plaques palpated.   Left testis descended , size is 20 , consistency is normal. No intra-testicular masses.   Right testis descended , size is 20 , consistency is normal . No intra-testicular masses.   Epididymes present, non-tender, not-enlarged.   Cord structures not remarkable.  SHENG deferred     Imaging/labs:  Lab Results   Component Value Date    CR 1.10 12/29/2022       Lab Results   Component Value Date    PSA 0.48 12/29/2022     Lipids did improve on statin:        ASSESSMENT:   Erectile dysfunction- likely organic    PLAN:    Testosterone, LH, PRL today    He had tried 2.5mg Cialis prescription in the past.  He would like to try 20mg dose.  Prescription placed for 20mg tadalafil.  Discussed side effects and how to take.      I discussed that ED is a strong predictor for cardiovascular disease and cardiac events, and I am concerned that he could be at risk for a cardiac event in the future.    I advised he continue optimizing correctable risk factors for vascular disease where applicable (lipids) and consider a referral to preventative " cardiology.    We discussed the possible utility of a penile duplex ultrasound.  While this would not change treatment options for ED, this could lend support to a diagnosis of inflow disease versus venous leak. He declines the PDUS.    Discussed [facility] referral - preventative cards- he would like to do this. Referral placed.    The risks of injection therapy, including pain, hematomas , PRIAPISM, and penile scar were explained.     He will let us know if interested in intracavernosal injection teaching/trial.      Copied cc to Consulting provider Dr. Arellano         Thank-you for the kind consultation.  Lan Alexandre MD     Urological Surgeon        Additional Coding Information:    Problems:  4 -- one or more chronic illnesses with exacerbation or side effects    Data Reviewed  3 or more studies reviewed, as listed above.   Normal PSA  Lipids improve on statin medication.    Tests ordered/pending: 3 labs ordered     Level of risk:  4 -- prescription drug management    Time spent:  24 minutes spent on the date of the encounter doing chart review, history and exam, documentation and further activities per the note

## 2023-03-16 NOTE — NURSING NOTE
"Chief Complaint   Patient presents with     Consult     Erectile dysfunction       Blood pressure 110/71, pulse 67, height 1.753 m (5' 9\"), weight 88.5 kg (195 lb), SpO2 96 %. Body mass index is 28.8 kg/m .    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     ADHD (attention deficit hyperactivity disorder), inattentive type     Atypical pigmented skin lesion     Benign prostatic hyperplasia with lower urinary tract symptoms     Degenerative arthritis of knee     Elevated fasting blood sugar     Erectile dysfunction     Hypercholesterolemia     Low vitamin B12 level     Lumbar back pain     Seborrheic keratoses     Thrombosed external hemorrhoid     Tubular adenoma     Insomnia, unspecified type     Chronic pain of left knee     Family history of squamous cell carcinoma     Snoring       Allergies   Allergen Reactions     No Known Allergies        Current Outpatient Medications   Medication Sig Dispense Refill     amphetamine-dextroamphetamine (ADDERALL) 30 MG tablet Take 1.5 tablets by mouth daily       atorvastatin (LIPITOR) 20 MG tablet Take 1 tablet (20 mg) by mouth daily 90 tablet 3     PAXLOVID, 300/100, 20 x 150 MG & 10 x 100MG therapy pack Take 3 tablets by mouth 2 times daily       tadalafil (CIALIS) 2.5 MG tablet Take 1 tablet (2.5 mg) by mouth daily 90 tablet 1     traZODone (DESYREL) 100 MG tablet Take 1 tablet (100 mg) by mouth At Bedtime 90 tablet 3     VUITY 1.25 % SOLN INSTILL 1 DROP IN BOTH EYES 1 TIME IN THE MORNING         Social History     Tobacco Use     Smoking status: Never     Smokeless tobacco: Never   Vaping Use     Vaping Use: Never used   Substance Use Topics     Alcohol use: Yes     Comment: infrequent drinker - one glass per week     Drug use: Never       [place]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

## 2023-03-17 LAB
TESTOST FREE SERPL-MCNC: 5.46 NG/DL
TESTOST SERPL-MCNC: 330 NG/DL (ref 240–950)

## 2023-03-18 DIAGNOSIS — R79.89 ELEVATED PROLACTIN LEVEL: Primary | ICD-10-CM

## 2023-03-18 LAB — TSH SERPL DL<=0.005 MIU/L-ACNC: 3.9 UIU/ML (ref 0.3–4.2)

## 2023-03-18 NOTE — RESULT ENCOUNTER NOTE
Dear Bob,     Here are your recent results.     Hormone labs are all overall normal.  Testosterone level is normal, I don't think Testosterone replacement would improve erectile function.        LH and prolactin are hormones that help regulate the testosterone level.    Lutropin (LH) is the signal from the brain to the testes to drive testosterone production.  Yours is normal.      Prolactin is borderline high.  This can be from low thyroid hormone. I will add a thyroid test to the blood you had drawn this week just in case.    I recommend you have a repeat thyroid at your next yearly check with your primary doctor.  If this remains elevated, this may require further investigation.      Please let us know if you have any questions or concerns.     Jae GARZA

## 2023-03-19 NOTE — RESULT ENCOUNTER NOTE
Dear Bob     Here are your recent thyroid test which was at the upper limit of normal.  I don't think there is any big concern with thyroid function, but I suggest you discuss with your primary doctor to see at what point they would consider thyroid replacement medications ( this is just a bit out of my wheelhouse).      Thank You,    Please let me know if you have any questions!    Jae GARZA

## 2023-03-21 ASSESSMENT — SLEEP AND FATIGUE QUESTIONNAIRES
HOW LIKELY ARE YOU TO NOD OFF OR FALL ASLEEP WHILE LYING DOWN TO REST IN THE AFTERNOON WHEN CIRCUMSTANCES PERMIT: HIGH CHANCE OF DOZING
HOW LIKELY ARE YOU TO NOD OFF OR FALL ASLEEP WHEN YOU ARE A PASSENGER IN A CAR FOR AN HOUR WITHOUT A BREAK: MODERATE CHANCE OF DOZING
HOW LIKELY ARE YOU TO NOD OFF OR FALL ASLEEP WHILE SITTING INACTIVE IN A PUBLIC PLACE: WOULD NEVER DOZE
HOW LIKELY ARE YOU TO NOD OFF OR FALL ASLEEP IN A CAR, WHILE STOPPED FOR A FEW MINUTES IN TRAFFIC: SLIGHT CHANCE OF DOZING
HOW LIKELY ARE YOU TO NOD OFF OR FALL ASLEEP WHILE SITTING QUIETLY AFTER LUNCH WITHOUT ALCOHOL: SLIGHT CHANCE OF DOZING
HOW LIKELY ARE YOU TO NOD OFF OR FALL ASLEEP WHILE SITTING AND TALKING TO SOMEONE: WOULD NEVER DOZE
HOW LIKELY ARE YOU TO NOD OFF OR FALL ASLEEP WHILE SITTING AND READING: MODERATE CHANCE OF DOZING
HOW LIKELY ARE YOU TO NOD OFF OR FALL ASLEEP WHILE WATCHING TV: SLIGHT CHANCE OF DOZING

## 2023-03-22 ENCOUNTER — THERAPY VISIT (OUTPATIENT)
Dept: SLEEP MEDICINE | Facility: CLINIC | Age: 63
End: 2023-03-22
Attending: PHYSICIAN ASSISTANT
Payer: COMMERCIAL

## 2023-03-22 DIAGNOSIS — G47.00 INSOMNIA, UNSPECIFIED TYPE: ICD-10-CM

## 2023-03-22 DIAGNOSIS — G25.81 RESTLESS LEGS SYNDROME (RLS): ICD-10-CM

## 2023-03-22 DIAGNOSIS — R06.81 WITNESSED APNEIC SPELLS: ICD-10-CM

## 2023-03-22 DIAGNOSIS — R06.83 SNORING: ICD-10-CM

## 2023-03-22 DIAGNOSIS — R25.8 NOCTURNAL LEG MOVEMENTS: ICD-10-CM

## 2023-03-22 PROCEDURE — 95810 POLYSOM 6/> YRS 4/> PARAM: CPT | Performed by: INTERNAL MEDICINE

## 2023-03-23 NOTE — PROCEDURES
" SLEEP STUDY INTERPRETATION  DIAGNOSTIC POLYSOMNOGRAPHY REPORT      Patient: MELISSA LIM  YOB: 1960  Study Date: 3/22/2023  MRN: [id number]  Referring Provider: Sybil Arellano MD  Ordering Provider: Nicolasa Beckwith PA-C    Indications for Polysomnography: The patient is a 62 year old Male who is 5' 9\" and weighs 195.0 lbs. His BMI is 28.9, Donaldson sleepiness scale 8/24 and neck circumference is 41 cm. A diagnostic polysomnogram was performed to evaluate for sleep apnea.    Polysomnogram Data: A full night polysomnogram recorded the standard physiologic parameters including EEG, EOG, EMG, ECG, nasal and oral airflow. Respiratory parameters of chest and abdominal movements were recorded with respiratory inductance plethysmography. Oxygen saturation was recorded by pulse oximetry. Hypopnea scoring rule used: 1B 4%.    Sleep Architecture: Fragmented sleep with reduced sleep efficiency.   The total recording time of the polysomnogram was 427.1 minutes. The total sleep time was 344.0 minutes. Sleep latency was decreased at 7.6 minutes without the use of a sleep aid. REM latency was 188.5 minutes. Arousal index was increased at 57.9 arousals per hour. Sleep efficiency was decreased at 80.5%. Wake after sleep onset was 75.0 minutes. The patient spent 9.9% of total sleep time in Stage N1, 40.0% in Stage N2, 24.1% in Stage N3, and 26.0% in REM. Time in REM supine was - minutes.    Respiration: Mild obstructive sleep apnea.     Events ? The polysomnogram revealed a presence of 35 obstructive, 5 central, and 3 mixed apneas resulting in an apnea index of 7.5 events per hour. There were - obstructive hypopneas and - central hypopneas resulting in an obstructive hypopnea index of - and central hypopnea index of - events per hour. The combined apnea/hypopnea index was 7.5 events per hour (central apnea/hypopnea index was 0.9 events per hour). The REM AHI was - events per hour. The supine AHI was 33.8 events per " hour. The RERA index was 12.6 events per hour.  The RDI was 20.1 events per hour.    Snoring - was reported as mild.    Respiratory rate and pattern - was notable for normal respiratory rate and pattern.    Sustained Sleep Associated Hypoventilation - Transcutaneous carbon dioxide monitoring was not used; however significant hypoventilation was not suggested by oximetry.    Sleep Associated Hypoxemia - (Greater than 5 minutes O2 sat at or below 88%) was not present. Baseline oxygen saturation was 95.4%. Lowest oxygen saturation was 90.0%. Time spent less than or equal to 88% was 0 minutes. Time spent less than or equal to 89% was 0 minutes.    Movement Activity: Excessive periodic limb movements of sleep and associated arousals.     Periodic Limb Activity - There were 531 PLMs during the entire study. The PLM index was 92.6 movements per hour. The PLM Arousal Index was 22.7 per hour.    REM EMG Activity - Excessive transient/sustained muscle activity was present.    Nocturnal Behavior - Abnormal sleep related behaviors were not noted during/arising out of NREM / REM sleep.     Bruxism - None apparent.    Cardiac Summary: Sinus rhythm.   The average pulse rate was 51.8 bpm. The minimum pulse rate was 45.0 bpm while the maximum pulse rate was 64.0 bpm.  Arrhythmias were not noted.    Assessment:     This sleep study shows a mild degree of obstructive sleep apnea, with sleep apnea events primarily occurring in supine sleep. Of note, supine REM was not captured during this test.     There was excessive periodic limb movements of sleep and associated arousals.     EMG tone was increased in REM, without evidence of dream enactment behavior.     Recommendations:    Depending on clinical indications for treatment of mild obstructive sleep apnea, therapy options can include the following.     Patient may be a candidate for dental appliance through referral to Sleep Dentistry for the treatment of obstructive sleep  apnea.    If there is excessive daytime sleepiness, treatment could be empirically initiated with Auto?titrating PAP therapy with a range of 5 to 15 cmH2O. Recommend clinical follow up with sleep management team.    If treatment of excessive periodic limb movements of sleep is clinically indicated, consider pharmacologic therapy with a dopaminergic agent or calcium channel alpha-2 delta ligand.    Clinically evaluate patient for history of dream enactment behavior, and if present, consider if patient has REM sleep behavioral disorder.      Diagnostic Codes:   Obstructive Sleep Apnea G47.33  Periodic Limb Movement Disorder G47.61  Repetitive Intrusions Into Sleep F51.8    3/22/2023 [place] [facility] Sleep Study (195.0 lbs) - AHI 7.5, RDI 20.1, Supine AHI 33.8, REM AHI -, Low O2 90.0%, Time Spent ?88% 0 minutes / Time Spent ?89% 0 minutes.     _____________________________________   Electronically Signed By: Irvin Stratton MD 3/23/2023

## 2023-03-24 LAB — SLPCOMP: NORMAL

## 2023-04-06 ENCOUNTER — OFFICE VISIT (OUTPATIENT)
Dept: DERMATOLOGY | Facility: CLINIC | Age: 63
End: 2023-04-06
Attending: FAMILY MEDICINE
Payer: COMMERCIAL

## 2023-04-06 DIAGNOSIS — L81.4 LENTIGO: ICD-10-CM

## 2023-04-06 DIAGNOSIS — Z12.83 SKIN EXAM FOR MALIGNANT NEOPLASM: Primary | ICD-10-CM

## 2023-04-06 DIAGNOSIS — D22.9 MULTIPLE NEVI: ICD-10-CM

## 2023-04-06 DIAGNOSIS — D18.01 CHERRY ANGIOMA: ICD-10-CM

## 2023-04-06 DIAGNOSIS — L57.0 ACTINIC KERATOSIS: ICD-10-CM

## 2023-04-06 DIAGNOSIS — Z80.9 FAMILY HISTORY OF SQUAMOUS CELL CARCINOMA: ICD-10-CM

## 2023-04-06 DIAGNOSIS — L82.1 SEBORRHEIC KERATOSES: ICD-10-CM

## 2023-04-06 DIAGNOSIS — L82.0 SEBORRHEIC KERATOSES, INFLAMED: ICD-10-CM

## 2023-04-06 PROCEDURE — 99203 OFFICE O/P NEW LOW 30 MIN: CPT | Mod: 25 | Performed by: DERMATOLOGY

## 2023-04-06 PROCEDURE — 17000 DESTRUCT PREMALG LESION: CPT | Mod: XS | Performed by: DERMATOLOGY

## 2023-04-06 PROCEDURE — 17110 DESTRUCTION B9 LES UP TO 14: CPT | Mod: GC | Performed by: DERMATOLOGY

## 2023-04-06 PROCEDURE — 17003 DESTRUCT PREMALG LES 2-14: CPT | Mod: XS | Performed by: DERMATOLOGY

## 2023-04-06 ASSESSMENT — PAIN SCALES - GENERAL: PAINLEVEL: NO PAIN (0)

## 2023-04-06 NOTE — LETTER
4/6/2023       RE: Bob Rodriguez  [address]  [place] [address]     Dear Colleague,    Thank you for referring your patient, Bob Rodriguez, to the [facility] [place] [facility]. Please see a copy of my visit note below.    [facility] Dermatology Note   Encounter Date: Apr 6, 2023  Office visit    Dermatology Problem List:    Last FBSE: 4/6/23    # AK  - s/p LN2  # ISK  - s/p LN2  # Benign skin findings    ___________________________________________    Assessment & Plan:    # Inflamed SK - L neck (x1), mid back (x1), central chest (x2)  # AK - L cheek (x2) and R cheek (x3)  Discussed the possible etiologies, clinical course, and management options of this condition with the patient. Will perform cryotherapy on the areas above  - See procedure note below    # Multiple clinically banal appearing nevi  # Lentigo  Discussed the possible etiologies, clinical course, and management options of this benign condition with the patient.  - Reviewed the ABCDEs of melanoma  - Recommend daily sunscreen use with SPF at least 30    # Seborrheic keratoses  # Cherry angiomata  Discussed the possible etiologies, clinical course, and management options of this benign condition with the patient.  - Reassurance provided    Procedures Performed:  Cryotherapy procedure note - ISK  - location:  L neck (x1), mid back (x1), central chest (x2)  - number of lesions treated: 4  After verbal consent and discussion of risks and benefits including but no limited to dyspigmentation/scar, blister, and pain, lesions treated with 1-2mm freeze border for 2 cycles with liquid nitrogen, post cryotherapy instructions provided    Cryotherapy procedure note - AK  - location: L cheek (x2) and R cheek (x3)  - number of lesions treated: 5  After verbal consent and discussion of risks and benefits including but no limited to  "dyspigmentation/scar, blister, and pain, lesions treated with 1-2mm freeze border for 2 cycles with liquid nitrogen, post cryotherapy instructions provided    Follow-up: 1-2y, sooner if needed    Staff Involved:  Patient was seen and staffed with attending physician Dr. Isaias Mendes MD  Med/Derm Resident PGY-5  P:[phone]    I have seen and examined this patient and agree with the assessment and plan as documented in the resident's note, and was present for all procedures.    Kuldip Esparza MD  Dermatology Attending    ___________________________________________      CC: Skin Check (Bob is here today for a skin check. He states \" I have multiple areas that concern me today\"/ )      HPI:  Mr. Bob Rodriguez is a(n) 62 year old male who presents to clinic today for FBSE. Reports:  - main concern are a couple spots on the cheeks and the neck/upper chest  - spots on the cheeks are described as red and scaly/flakey  - have never fully resolved  - other spots are new on neck, chest, and back  - associated with new growth and dark color  - otherwise feeling well in usual state of health    Physical exam:  General: in no acute distress, well-developed, well-nourished  Skin:  - skin type: medium fair  - erythematous macules with rough, gritty scale on L cheek (x2) and R cheek (x3)  - multiple light brown to tan papules with reassuring pigment network on dermoscopy on trunk and extremities  - light brown macules on sun exposed skin  - tan to light brown waxy stuck on papules and plaques on face, scalp, trunk, and extremities. Some on L neck (x1), mid back (x1), central chest (x2) have erythematous hue and evidence of excoriations  - red or purple papules that quynh with diascopy on trunk and extremities  - No other lesions of concern on areas examined.     Medications:  Current Outpatient Medications   Medication    amphetamine-dextroamphetamine (ADDERALL) 30 MG tablet    atorvastatin (LIPITOR) 20 MG " tablet    tadalafil (CIALIS) 20 MG tablet    traZODone (DESYREL) 100 MG tablet    VUITY 1.25 % SOLN     No current facility-administered medications for this visit.      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    ADHD (attention deficit hyperactivity disorder), inattentive type    Atypical pigmented skin lesion    Benign prostatic hyperplasia with lower urinary tract symptoms    Degenerative arthritis of knee    Elevated fasting blood sugar    Erectile dysfunction    Hypercholesterolemia    Low vitamin B12 level    Lumbar back pain    Seborrheic keratoses    Thrombosed external hemorrhoid    Tubular adenoma    Insomnia, unspecified type    Chronic pain of left knee    Family history of squamous cell carcinoma    Snoring     Past Medical History:   Diagnosis Date    Degenerative arthritis of knee 11/15/2012       CC Sybil Arellano MD  [address] [place] [address] on close of this encounter.

## 2023-04-06 NOTE — NURSING NOTE
"Dermatology Rooming Note    Bob Rodriguez's goals for this visit include:   Chief Complaint   Patient presents with     Skin Check     Bob is here today for a skin check. He states \" I have multiple areas that concern me today\"        Beryl Fontenot, HANNA  "

## 2023-04-06 NOTE — PATIENT INSTRUCTIONS
Cryotherapy    What is it?  Use of a very cold liquid, such as liquid nitrogen, to freeze and destroy abnormal skin cells that need to be removed    What should I expect?  Tenderness and redness  A small blister that might grow and fill with dark purple blood. There may be crusting.  More than one treatment may be needed if the lesions do not go away.    How do I care for the treated area?  Gently wash the area with your hands when bathing.  Use a thin layer of Vaseline to help with healing. You may use a Band-Aid.   The area should heal within 7-10 days and may leave behind a pink or lighter color.   Do not use an antibiotic or Neosporin ointment.   You may take acetaminophen (Tylenol) for pain.     Call your doctor if you have:  Severe pain  Signs of infection (warmth, redness, cloudy yellow drainage, and or a bad smell)  Questions or concerns    Who should I call with questions?      [facility]: [phone]      [facility]: [phone]      For urgent needs outside of business hours call the [facility] at [phone] and ask for the dermatology resident on call     Dry Skin    What is dry skin?  Common skin problem  Can be worse during the winter   Affects all ages  Occurs in people with or without other skin problems    What does it look like?  Fine lines in the skin become more visible   Rough feeling skin   Flaky skin  Most common on the arms and legs  Skin can become cracked, especially on the hands and feet    What are some problems caused by dry skin?   Itching  Rubbing or scratching can cause thickened, rough skin patches  Cracks in skin can be painful  Red, itchy, scaly skin (called eczema) can occur  Yellow crusting or pus could be signs of an infection    What causes dry skin?  A lack of water in the top layer of the skin  Too much soapy water,  hot water, or harsh chemicals  Aging and sun damage    How do I treat dry  skin?  Shower or bathe daily for under ten minutes with lukewarm water and mild soap.  Pat yourself dry with a towel gently and leave your skin slightly damp.  Use moisturizing cream or ointment right away.  Avoid lotions.    What kind of mild soap should I be using?  Camay , Dove , Tone , Neutrogena , Purpose , or Oil of Olay   A non-detergent cleanser, like Cetaphil , can be used.    What should I stay away from?  Scented soaps   Bath oils    What moisturizers should I be using?  Cetaphil Cream,CeraVe Cream, Vanicream, Aquaphilic, Eucerin, Aquaphor, or Vaseline   Always apply after showering or bathing.  Reapply throughout the day, if possible.  If dry skin affects your hands, always reapply after handwashing.    What else should I know?  Using a humidifier during winter months may help.  If dry skin gets worse or if eczema develops, a steroid cream may be needed.

## 2023-04-06 NOTE — PROGRESS NOTES
[facility] Dermatology Note   Encounter Date: Apr 6, 2023  Office visit    Dermatology Problem List:    Last FBSE: 4/6/23    # AK  - s/p LN2  # ISK  - s/p LN2  # Benign skin findings    ___________________________________________    Assessment & Plan:    # Inflamed SK - L neck (x1), mid back (x1), central chest (x2)  # AK - L cheek (x2) and R cheek (x3)  Discussed the possible etiologies, clinical course, and management options of this condition with the patient. Will perform cryotherapy on the areas above  - See procedure note below    # Multiple clinically banal appearing nevi  # Lentigo  Discussed the possible etiologies, clinical course, and management options of this benign condition with the patient.  - Reviewed the ABCDEs of melanoma  - Recommend daily sunscreen use with SPF at least 30    # Seborrheic keratoses  # Cherry angiomata  Discussed the possible etiologies, clinical course, and management options of this benign condition with the patient.  - Reassurance provided    Procedures Performed:  Cryotherapy procedure note - ISK  - location:  L neck (x1), mid back (x1), central chest (x2)  - number of lesions treated: 4  After verbal consent and discussion of risks and benefits including but no limited to dyspigmentation/scar, blister, and pain, lesions treated with 1-2mm freeze border for 2 cycles with liquid nitrogen, post cryotherapy instructions provided    Cryotherapy procedure note - AK  - location: L cheek (x2) and R cheek (x3)  - number of lesions treated: 5  After verbal consent and discussion of risks and benefits including but no limited to dyspigmentation/scar, blister, and pain, lesions treated with 1-2mm freeze border for 2 cycles with liquid nitrogen, post cryotherapy instructions provided    Follow-up: 1-2y, sooner if needed    Staff Involved:  Patient was seen and staffed with attending physician Dr. Isaias Mendes MD  Med/Derm Resident  "[id number]  P:[phone]    I have seen and examined this patient and agree with the assessment and plan as documented in the resident's note, and was present for all procedures.    Kuldip Esparza MD  Dermatology Attending    ___________________________________________      CC: Skin Check (Bob is here today for a skin check. He states \" I have multiple areas that concern me today\"/ )      HPI:  Mr. Bob Rodriguez is a(n) 62 year old male who presents to clinic today for FBSE. Reports:  - main concern are a couple spots on the cheeks and the neck/upper chest  - spots on the cheeks are described as red and scaly/flakey  - have never fully resolved  - other spots are new on neck, chest, and back  - associated with new growth and dark color  - otherwise feeling well in usual state of health    Physical exam:  General: in no acute distress, well-developed, well-nourished  Skin:  - skin type: medium fair  - erythematous macules with rough, gritty scale on L cheek (x2) and R cheek (x3)  - multiple light brown to tan papules with reassuring pigment network on dermoscopy on trunk and extremities  - light brown macules on sun exposed skin  - tan to light brown waxy stuck on papules and plaques on face, scalp, trunk, and extremities. Some on L neck (x1), mid back (x1), central chest (x2) have erythematous hue and evidence of excoriations  - red or purple papules that quynh with diascopy on trunk and extremities  - No other lesions of concern on areas examined.     Medications:  Current Outpatient Medications   Medication     amphetamine-dextroamphetamine (ADDERALL) 30 MG tablet     atorvastatin (LIPITOR) 20 MG tablet     tadalafil (CIALIS) 20 MG tablet     traZODone (DESYREL) 100 MG tablet     VUITY 1.25 % SOLN     No current facility-administered medications for this visit.      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     ADHD (attention deficit " hyperactivity disorder), inattentive type     Atypical pigmented skin lesion     Benign prostatic hyperplasia with lower urinary tract symptoms     Degenerative arthritis of knee     Elevated fasting blood sugar     Erectile dysfunction     Hypercholesterolemia     Low vitamin B12 level     Lumbar back pain     Seborrheic keratoses     Thrombosed external hemorrhoid     Tubular adenoma     Insomnia, unspecified type     Chronic pain of left knee     Family history of squamous cell carcinoma     Snoring     Past Medical History:   Diagnosis Date     Degenerative arthritis of knee 11/15/2012       CC Sybil Arellano MD  [address] [place] [address] on close of this encounter.

## 2023-04-18 ENCOUNTER — MYC MEDICAL ADVICE (OUTPATIENT)
Dept: FAMILY MEDICINE | Facility: CLINIC | Age: 63
End: 2023-04-18
Payer: COMMERCIAL

## 2023-04-20 NOTE — TELEPHONE ENCOUNTER
Writer responded via [employer].    Joan Stokes, BRENDAN RN  [facility]

## 2023-04-29 PROBLEM — D22.9 MULTIPLE NEVI: Status: ACTIVE | Noted: 2023-04-29

## 2023-04-29 PROBLEM — L82.0 SEBORRHEIC KERATOSES, INFLAMED: Status: ACTIVE | Noted: 2023-04-29

## 2023-04-29 PROBLEM — Z12.83 SKIN EXAM FOR MALIGNANT NEOPLASM: Status: ACTIVE | Noted: 2023-04-29

## 2023-05-07 ASSESSMENT — SLEEP AND FATIGUE QUESTIONNAIRES
HOW LIKELY ARE YOU TO NOD OFF OR FALL ASLEEP WHILE SITTING QUIETLY AFTER LUNCH WITHOUT ALCOHOL: SLIGHT CHANCE OF DOZING
HOW LIKELY ARE YOU TO NOD OFF OR FALL ASLEEP WHILE LYING DOWN TO REST IN THE AFTERNOON WHEN CIRCUMSTANCES PERMIT: HIGH CHANCE OF DOZING
HOW LIKELY ARE YOU TO NOD OFF OR FALL ASLEEP IN A CAR, WHILE STOPPED FOR A FEW MINUTES IN TRAFFIC: SLIGHT CHANCE OF DOZING
HOW LIKELY ARE YOU TO NOD OFF OR FALL ASLEEP WHILE SITTING AND TALKING TO SOMEONE: WOULD NEVER DOZE
HOW LIKELY ARE YOU TO NOD OFF OR FALL ASLEEP WHILE WATCHING TV: MODERATE CHANCE OF DOZING
HOW LIKELY ARE YOU TO NOD OFF OR FALL ASLEEP WHILE SITTING AND READING: MODERATE CHANCE OF DOZING
HOW LIKELY ARE YOU TO NOD OFF OR FALL ASLEEP WHEN YOU ARE A PASSENGER IN A CAR FOR AN HOUR WITHOUT A BREAK: HIGH CHANCE OF DOZING
HOW LIKELY ARE YOU TO NOD OFF OR FALL ASLEEP WHILE SITTING INACTIVE IN A PUBLIC PLACE: SLIGHT CHANCE OF DOZING

## 2023-05-08 ENCOUNTER — VIRTUAL VISIT (OUTPATIENT)
Dept: SLEEP MEDICINE | Facility: CLINIC | Age: 63
End: 2023-05-08
Payer: COMMERCIAL

## 2023-05-08 VITALS — BODY MASS INDEX: 28.88 KG/M2 | WEIGHT: 195 LBS | HEIGHT: 69 IN

## 2023-05-08 DIAGNOSIS — E83.19 OTHER DISORDERS OF IRON METABOLISM: ICD-10-CM

## 2023-05-08 DIAGNOSIS — G47.52 RBD (REM BEHAVIORAL DISORDER): ICD-10-CM

## 2023-05-08 DIAGNOSIS — G47.33 OSA (OBSTRUCTIVE SLEEP APNEA): Primary | ICD-10-CM

## 2023-05-08 DIAGNOSIS — G25.81 RESTLESS LEGS SYNDROME (RLS): ICD-10-CM

## 2023-05-08 DIAGNOSIS — G47.61 PLMD (PERIODIC LIMB MOVEMENT DISORDER): ICD-10-CM

## 2023-05-08 PROCEDURE — 99215 OFFICE O/P EST HI 40 MIN: CPT | Mod: VID | Performed by: PHYSICIAN ASSISTANT

## 2023-05-08 NOTE — NURSING NOTE
Is the patient currently in the state of [place]? YES    Visit mode:VIDEO    If the visit is dropped, the patient can be reconnected by: VIDEO VISIT: Text to cell phone: [phone]    Will anyone else be joining the visit? NO    How would you like to obtain your AVS? MyChart    Are changes needed to the allergy or medication list? NO    Reason for visit: Video Visit (PSG follow up)    Has patient had flu shot for current/most recent flu season? If so, when? Yes: 12/29/2022    Pt does not check BP at home, but states they are WNL when in clinic    BINH Santiago/SANTI

## 2023-05-08 NOTE — PROGRESS NOTES
Virtual Visit Details    Type of service:  Video Visit   Video visit start time: 10:35AM  Video visit end time: 11:10AM  Originating Location (pt. Location): Home    Distant Location (provider location):  Off-site  Platform used for Video Visit: [facility]   Outpatient Sleep Medicine  May 8, 2023       Name: Bob Rodriguez MRN# [id number]   Age: 62 year old YOB: 1960            Assessment and Plan:   1. SARITA (obstructive sleep apnea)  2. Restless legs syndrome (RLS)  3. PLMD (periodic limb movement disorder)  4. RBD (REM behavioral disorder)  5. Other disorders of iron metabolism    During this visit, we reviewed the summary of the study including stage, position, event distribution, oximetry and heart rate. Sleep architecture showed all stages of sleep present but decrease sleep efficiency 80.5%, arousal index increased at 57.9.  There was evidence of mild supine predominant SARITA without hypoxemia - AHI 7.5, RDI 20.1, Supine AHI 33.8, non-supine AHI 0.7, REM AHI -, Low O2 90.0%, Time Spent ?88% 0 minutes / Time Spent ?89% 0 minutes. There was excessive periodic limb movements of sleep with associated arousal-PLM index 92.6, PLM arousal index 22.7.  There was increased EMG tone in REM without evidence of dream enactment. Normal sinus rhythm on cardiac monitoring.     Reviewed diagnosis of SARITA with patient and discussed treatment options including auto CPAP, oral appliance, positional restriction device (such as sleep noodle, slumber bump, Zzoma pillow).  Patient has elected to start with positional restriction.  If uncomfortable or deemed ineffective we will plan to pursue oral appliance therapy as he is not interested in CPAP at this time.    Discussed finding of excessive PLM's some with arousal.  Has history of occasional RLS, once or twice a week but not overly bothersome to patient.  His PLMS are more bothersome to wife Oksana than to himself.  After discussion of  "options we have agreed to start with drawing ferritin level to evaluate iron stores.  If ferritin is less than 75 will recommend supplementation.  If ferritin is greater than 75 will plan to start gabapentin.  Patient reports he has upcoming visit with PCP later this week with labs and we will plan to get his labs drawn at that time.  Will follow-up with results of lab via [employer].  - Ferritin; Future    Lastly, discussed the finding of REM sleep without atonia. Can recall 1 episode of dream enactment in the past where \"I was playing soccer in my dream and I reached up high with my leg and in real life my leg went up high and it woke me up\". Discussed watchful waiting vs starting high dose melatonin and he wants to start melatonin. Discussed people tend to respond between 6-18mg so starting with a 10mg dose is reasonable at bedtime. Will consider referral to Dr. Chávez pending progress.     Will plan for follow-up in about 3 months to follow-up on the above.        Chief Complaint      Chief Complaint   Patient presents with     Video Visit     PSG follow up            History of Present Illness:   Bob Rodriguez is a 62 year old male who presents to the clinic for results of recent sleep study completed on 3/22/2023. Study was completed to evaluate for SARITA/PLMS.      Reviewed results of sleep study with patient as follows:  SLEEP STUDY INTERPRETATION  DIAGNOSTIC POLYSOMNOGRAPHY REPORT   Patient: BOB RODRIGUEZ  YOB: 1960  Study Date: 3/22/2023  MRN: [id number]  Referring Provider: Sybil Arellano MD  Ordering Provider: Nicolasa Beckwith PA-C     Indications for Polysomnography: The patient is a 62 year old Male who is 5' 9\" and weighs 195.0 lbs. His BMI is 28.9, [place] sleepiness scale 8/24 and neck circumference is 41 cm. A diagnostic polysomnogram was performed to evaluate for sleep apnea.     Polysomnogram Data: A full night polysomnogram recorded the standard physiologic parameters including EEG, " EOG, EMG, ECG, nasal and oral airflow. Respiratory parameters of chest and abdominal movements were recorded with respiratory inductance plethysmography. Oxygen saturation was recorded by pulse oximetry. Hypopnea scoring rule used: 1B 4%.     Sleep Architecture: Fragmented sleep with reduced sleep efficiency.   The total recording time of the polysomnogram was 427.1 minutes. The total sleep time was 344.0 minutes. Sleep latency was decreased at 7.6 minutes without the use of a sleep aid. REM latency was 188.5 minutes. Arousal index was increased at 57.9 arousals per hour. Sleep efficiency was decreased at 80.5%. Wake after sleep onset was 75.0 minutes. The patient spent 9.9% of total sleep time in Stage N1, 40.0% in Stage N2, 24.1% in Stage N3, and 26.0% in REM. Time in REM supine was - minutes.     Respiration: Mild obstructive sleep apnea.     Events ? The polysomnogram revealed a presence of 35 obstructive, 5 central, and 3 mixed apneas resulting in an apnea index of 7.5 events per hour. There were - obstructive hypopneas and - central hypopneas resulting in an obstructive hypopnea index of - and central hypopnea index of - events per hour. The combined apnea/hypopnea index was 7.5 events per hour (central apnea/hypopnea index was 0.9 events per hour). The REM AHI was - events per hour. The supine AHI was 33.8 events per hour. The RERA index was 12.6 events per hour.  The RDI was 20.1 events per hour.    Snoring - was reported as mild.    Respiratory rate and pattern - was notable for normal respiratory rate and pattern.    Sustained Sleep Associated Hypoventilation - Transcutaneous carbon dioxide monitoring was not used; however significant hypoventilation was not suggested by oximetry.    Sleep Associated Hypoxemia - (Greater than 5 minutes O2 sat at or below 88%) was not present. Baseline oxygen saturation was 95.4%. Lowest oxygen saturation was 90.0%. Time spent less than or equal to 88% was 0 minutes. Time  spent less than or equal to 89% was 0 minutes.     Movement Activity: Excessive periodic limb movements of sleep and associated arousals.     Periodic Limb Activity - There were 531 PLMs during the entire study. The PLM index was 92.6 movements per hour. The PLM Arousal Index was 22.7 per hour.    REM EMG Activity - Excessive transient/sustained muscle activity was present.    Nocturnal Behavior - Abnormal sleep related behaviors were not noted during/arising out of NREM / REM sleep.     Bruxism - None apparent.     Cardiac Summary: Sinus rhythm.   The average pulse rate was 51.8 bpm. The minimum pulse rate was 45.0 bpm while the maximum pulse rate was 64.0 bpm.  Arrhythmias were not noted.     Assessment:     This sleep study shows a mild degree of obstructive sleep apnea, with sleep apnea events primarily occurring in supine sleep. Of note, supine REM was not captured during this test.     There was excessive periodic limb movements of sleep and associated arousals.     EMG tone was increased in REM, without evidence of dream enactment behavior.      Recommendations:    Depending on clinical indications for treatment of mild obstructive sleep apnea, therapy options can include the following.     Patient may be a candidate for dental appliance through referral to Sleep Dentistry for the treatment of obstructive sleep apnea.    If there is excessive daytime sleepiness, treatment could be empirically initiated with Auto?titrating PAP therapy with a range of 5 to 15 cmH2O. Recommend clinical follow up with sleep management team.    If treatment of excessive periodic limb movements of sleep is clinically indicated, consider pharmacologic therapy with a dopaminergic agent or calcium channel alpha-2 delta ligand.    Clinically evaluate patient for history of dream enactment behavior, and if present, consider if patient has REM sleep behavioral disorder.        Past medical/surgical history, family history, social  "history, medications and allergies were reviewed.           Physical Examination:   Ht 1.753 m (5' 9\")   Wt 88.5 kg (195 lb)   BMI 28.80 kg/m    General appearance: Awake, alert, cooperative. Well groomed. Sitting comfortably in chair. In no apparent distress.  HEENT: Head: Normocephalic, atraumatic. Eyes:Conjunctiva clear. Sclera normal. Nose: External appearance without deformity.   Pulmonary:  Able to speak easily in full sentences. No cough or wheeze.   Skin:  No rashes or significant lesions on visible skin.   Neurologic: Alert, oriented x3.   Psychiatric: Mood euthymic. Affect congruent with full range and intensity.      CC:  Sybil Arellano PA-C  May 8, 2023     [facility]  [address] [place] [address]     [facility]  [address] [address]    Chart documentation was completed, in part, with [employer] voice-recognition software. Even though reviewed, some grammatical, spelling, and word errors may remain.    54 minutes spent on day of encounter doing chart review, history and exam, documentation, and further activities as noted above    "

## 2023-05-10 ENCOUNTER — OFFICE VISIT (OUTPATIENT)
Dept: FAMILY MEDICINE | Facility: CLINIC | Age: 63
End: 2023-05-10
Payer: COMMERCIAL

## 2023-05-10 VITALS
HEIGHT: 68 IN | DIASTOLIC BLOOD PRESSURE: 77 MMHG | HEART RATE: 97 BPM | RESPIRATION RATE: 18 BRPM | BODY MASS INDEX: 31.07 KG/M2 | TEMPERATURE: 98.5 F | OXYGEN SATURATION: 97 % | WEIGHT: 205 LBS | SYSTOLIC BLOOD PRESSURE: 132 MMHG

## 2023-05-10 DIAGNOSIS — M25.562 CHRONIC PAIN OF LEFT KNEE: ICD-10-CM

## 2023-05-10 DIAGNOSIS — G25.81 RESTLESS LEGS SYNDROME (RLS): ICD-10-CM

## 2023-05-10 DIAGNOSIS — Z98.890 HISTORY OF REPAIR OF ANTERIOR CRUCIATE LIGAMENT OF LEFT KNEE: ICD-10-CM

## 2023-05-10 DIAGNOSIS — G89.29 CHRONIC PAIN OF LEFT KNEE: ICD-10-CM

## 2023-05-10 DIAGNOSIS — E78.00 HYPERCHOLESTEROLEMIA: Primary | ICD-10-CM

## 2023-05-10 DIAGNOSIS — G47.61 PLMD (PERIODIC LIMB MOVEMENT DISORDER): ICD-10-CM

## 2023-05-10 LAB
CHOLEST SERPL-MCNC: 137 MG/DL
FERRITIN SERPL-MCNC: 160 NG/ML (ref 31–409)
HDLC SERPL-MCNC: 55 MG/DL
LDLC SERPL CALC-MCNC: 69 MG/DL
NONHDLC SERPL-MCNC: 82 MG/DL
TRIGL SERPL-MCNC: 66 MG/DL

## 2023-05-10 PROCEDURE — 82728 ASSAY OF FERRITIN: CPT | Performed by: FAMILY MEDICINE

## 2023-05-10 PROCEDURE — 36415 COLL VENOUS BLD VENIPUNCTURE: CPT | Performed by: FAMILY MEDICINE

## 2023-05-10 PROCEDURE — 99214 OFFICE O/P EST MOD 30 MIN: CPT | Performed by: FAMILY MEDICINE

## 2023-05-10 PROCEDURE — 80061 LIPID PANEL: CPT | Performed by: FAMILY MEDICINE

## 2023-05-10 ASSESSMENT — PAIN SCALES - GENERAL: PAINLEVEL: NO PAIN (0)

## 2023-05-10 NOTE — PATIENT INSTRUCTIONS
KNEE:  Dr. Macey Faye [facility] [phone]    [facility]  [phone]

## 2023-05-10 NOTE — PROGRESS NOTES
"  Assessment & Plan     Bob was seen today for results.    Diagnoses and all orders for this visit:    Hypercholesterolemia    Started on statin 12/2022    Now here for recheck    No problems or side effects    Recheck lipids today    Continue atorvastatin; will adjust dose if needed  -     Lipid panel reflex to direct LDL Non-fasting; Future  -     Lipid panel reflex to direct LDL Non-fasting    Chronic pain of left knee  History of repair of anterior cruciate ligament of left knee    Did show some moderate arthritis on xray    Has been doing physical therapy; hasn't really helped    At this point would like to consider injections or surgical options    Referred ortho  -     Orthopedic  Referral; Future      Restless legs syndrome (RLS)  -     Ferritin    PLMD (periodic limb movement disorder)  -     Ferritin            Sybil Arellano MD  [facility]    Ted Rojas is a 62 year old, presenting for the following health issues:  Results        5/10/2023     2:11 PM   Additional Questions   Roomed by Merlyn KENDRICK     History of Present Illness       Hyperlipidemia:  He presents for follow up of hyperlipidemia.  He is taking medication to lower cholesterol. He is not having myalgia or other side effects to statin medications.    He eats 2-3 servings of fruits and vegetables daily.He consumes 1 sweetened beverage(s) daily.He exercises with enough effort to increase his heart rate 9 or less minutes per day.  He exercises with enough effort to increase his heart rate 4 days per week.   He is taking medications regularly.     Saw sleep doc yesterday - will check ferritin today    Left knee - pain.  Had ACL cadaver replacement.  18 yrs ago now.      Review of Systems         Objective    /77 (BP Location: Right arm, Patient Position: Sitting, Cuff Size: Adult Regular)   Pulse 97   Temp 98.5  F (36.9  C) (Oral)   Resp 18   Ht 1.73 m (5' 8.1\")   Wt 93 kg (205 lb)   SpO2 " 97%   BMI 31.08 kg/m    Body mass index is 31.08 kg/m .  Physical Exam

## 2023-05-12 ENCOUNTER — TELEPHONE (OUTPATIENT)
Dept: SLEEP MEDICINE | Facility: CLINIC | Age: 63
End: 2023-05-12
Payer: COMMERCIAL

## 2023-05-12 ENCOUNTER — MYC MEDICAL ADVICE (OUTPATIENT)
Dept: SLEEP MEDICINE | Facility: CLINIC | Age: 63
End: 2023-05-12
Payer: COMMERCIAL

## 2023-05-12 RX ORDER — GABAPENTIN 100 MG/1
CAPSULE ORAL
Qty: 90 CAPSULE | Refills: 1 | Status: SHIPPED | OUTPATIENT
Start: 2023-05-12 | End: 2024-06-18

## 2023-05-12 NOTE — TELEPHONE ENCOUNTER
Reason for Call:  Appointment Request    Patient requesting this type of appt:  Follow up video    Requested provider: Nicolasa Bowers    Reason patient unable to be scheduled: Not within requested timeframe    When does patient want to be seen/preferred time: Two months from now    Comments: Bob was seen by Nicolasa Bowers and told to schedule a follow up for two months out, but there isn't an available appointment until September.  Please call him to schedule per Elissa.      Could we send this information to you in [employer] or would you prefer to receive a phone call?:   Patient would like to be contacted via [employer]    Call taken on 5/12/2023 at 2:34 PM by Chrissie Araujo

## 2023-05-12 NOTE — TELEPHONE ENCOUNTER
Patient wanting to make sure he is ok to continue taking his Trazodone as well with the Gabapentin.

## 2023-07-02 DIAGNOSIS — G47.00 INSOMNIA, UNSPECIFIED TYPE: ICD-10-CM

## 2023-07-04 RX ORDER — TRAZODONE HYDROCHLORIDE 100 MG/1
TABLET ORAL
Qty: 90 TABLET | Refills: 3 | OUTPATIENT
Start: 2023-07-04

## 2023-07-04 NOTE — TELEPHONE ENCOUNTER
Refusing. Should have refills on file. Ordered 10/25/2022. 90 tablets with 3 refills.     BRENDA SahuN RN  [facility]

## 2023-10-01 DIAGNOSIS — G47.00 INSOMNIA, UNSPECIFIED TYPE: ICD-10-CM

## 2023-10-02 RX ORDER — TRAZODONE HYDROCHLORIDE 100 MG/1
100 TABLET ORAL AT BEDTIME
Qty: 90 TABLET | Refills: 3 | OUTPATIENT
Start: 2023-10-02

## 2023-10-27 DIAGNOSIS — G47.00 INSOMNIA, UNSPECIFIED TYPE: ICD-10-CM

## 2023-10-27 RX ORDER — TRAZODONE HYDROCHLORIDE 100 MG/1
100 TABLET ORAL AT BEDTIME
Qty: 90 TABLET | Refills: 1 | Status: SHIPPED | OUTPATIENT
Start: 2023-10-27 | End: 2024-02-23

## 2024-01-01 ENCOUNTER — MYC MEDICAL ADVICE (OUTPATIENT)
Dept: FAMILY MEDICINE | Facility: CLINIC | Age: 64
End: 2024-01-01
Payer: COMMERCIAL

## 2024-01-05 DIAGNOSIS — E78.00 HYPERCHOLESTEROLEMIA: ICD-10-CM

## 2024-01-05 RX ORDER — ATORVASTATIN CALCIUM 20 MG/1
20 TABLET, FILM COATED ORAL DAILY
Qty: 90 TABLET | Refills: 0 | Status: SHIPPED | OUTPATIENT
Start: 2024-01-05 | End: 2024-02-23

## 2024-02-16 SDOH — HEALTH STABILITY: PHYSICAL HEALTH: ON AVERAGE, HOW MANY MINUTES DO YOU ENGAGE IN EXERCISE AT THIS LEVEL?: 30 MIN

## 2024-02-16 SDOH — HEALTH STABILITY: PHYSICAL HEALTH: ON AVERAGE, HOW MANY DAYS PER WEEK DO YOU ENGAGE IN MODERATE TO STRENUOUS EXERCISE (LIKE A BRISK WALK)?: 2 DAYS

## 2024-02-16 ASSESSMENT — SOCIAL DETERMINANTS OF HEALTH (SDOH): HOW OFTEN DO YOU GET TOGETHER WITH FRIENDS OR RELATIVES?: ONCE A WEEK

## 2024-02-23 ENCOUNTER — OFFICE VISIT (OUTPATIENT)
Dept: FAMILY MEDICINE | Facility: CLINIC | Age: 64
End: 2024-02-23
Attending: FAMILY MEDICINE
Payer: COMMERCIAL

## 2024-02-23 VITALS
WEIGHT: 205 LBS | OXYGEN SATURATION: 98 % | BODY MASS INDEX: 31.07 KG/M2 | RESPIRATION RATE: 16 BRPM | TEMPERATURE: 97.4 F | SYSTOLIC BLOOD PRESSURE: 130 MMHG | HEART RATE: 65 BPM | HEIGHT: 68 IN | DIASTOLIC BLOOD PRESSURE: 80 MMHG

## 2024-02-23 DIAGNOSIS — Z00.00 ROUTINE GENERAL MEDICAL EXAMINATION AT A HEALTH CARE FACILITY: Primary | ICD-10-CM

## 2024-02-23 DIAGNOSIS — Z13.1 SCREENING FOR DIABETES MELLITUS: ICD-10-CM

## 2024-02-23 DIAGNOSIS — Z12.5 SCREENING FOR PROSTATE CANCER: ICD-10-CM

## 2024-02-23 DIAGNOSIS — N52.9 ERECTILE DYSFUNCTION, UNSPECIFIED ERECTILE DYSFUNCTION TYPE: ICD-10-CM

## 2024-02-23 DIAGNOSIS — G47.00 INSOMNIA, UNSPECIFIED TYPE: ICD-10-CM

## 2024-02-23 DIAGNOSIS — E78.00 HYPERCHOLESTEROLEMIA: ICD-10-CM

## 2024-02-23 PROBLEM — M54.50 LUMBAR BACK PAIN: Status: RESOLVED | Noted: 2022-03-25 | Resolved: 2024-02-23

## 2024-02-23 LAB
ANION GAP SERPL CALCULATED.3IONS-SCNC: 9 MMOL/L (ref 7–15)
BUN SERPL-MCNC: 20.5 MG/DL (ref 8–23)
CALCIUM SERPL-MCNC: 9.7 MG/DL (ref 8.8–10.2)
CHLORIDE SERPL-SCNC: 102 MMOL/L (ref 98–107)
CHOLEST SERPL-MCNC: 153 MG/DL
CREAT SERPL-MCNC: 1.02 MG/DL (ref 0.67–1.17)
DEPRECATED HCO3 PLAS-SCNC: 27 MMOL/L (ref 22–29)
EGFRCR SERPLBLD CKD-EPI 2021: 84 ML/MIN/1.73M2
FASTING STATUS PATIENT QL REPORTED: NO
GLUCOSE SERPL-MCNC: 99 MG/DL (ref 70–99)
HBA1C MFR BLD: 5.5 % (ref 0–5.6)
HDLC SERPL-MCNC: 63 MG/DL
LDLC SERPL CALC-MCNC: 75 MG/DL
NONHDLC SERPL-MCNC: 90 MG/DL
POTASSIUM SERPL-SCNC: 5 MMOL/L (ref 3.4–5.3)
PSA SERPL DL<=0.01 NG/ML-MCNC: 0.26 NG/ML (ref 0–4.5)
SODIUM SERPL-SCNC: 138 MMOL/L (ref 135–145)
TRIGL SERPL-MCNC: 74 MG/DL

## 2024-02-23 PROCEDURE — 90750 HZV VACC RECOMBINANT IM: CPT | Performed by: STUDENT IN AN ORGANIZED HEALTH CARE EDUCATION/TRAINING PROGRAM

## 2024-02-23 PROCEDURE — 36415 COLL VENOUS BLD VENIPUNCTURE: CPT | Performed by: STUDENT IN AN ORGANIZED HEALTH CARE EDUCATION/TRAINING PROGRAM

## 2024-02-23 PROCEDURE — 80048 BASIC METABOLIC PNL TOTAL CA: CPT | Performed by: STUDENT IN AN ORGANIZED HEALTH CARE EDUCATION/TRAINING PROGRAM

## 2024-02-23 PROCEDURE — 90471 IMMUNIZATION ADMIN: CPT | Performed by: STUDENT IN AN ORGANIZED HEALTH CARE EDUCATION/TRAINING PROGRAM

## 2024-02-23 PROCEDURE — 83036 HEMOGLOBIN GLYCOSYLATED A1C: CPT | Performed by: STUDENT IN AN ORGANIZED HEALTH CARE EDUCATION/TRAINING PROGRAM

## 2024-02-23 PROCEDURE — 80061 LIPID PANEL: CPT | Performed by: STUDENT IN AN ORGANIZED HEALTH CARE EDUCATION/TRAINING PROGRAM

## 2024-02-23 PROCEDURE — G0103 PSA SCREENING: HCPCS | Performed by: STUDENT IN AN ORGANIZED HEALTH CARE EDUCATION/TRAINING PROGRAM

## 2024-02-23 PROCEDURE — 99396 PREV VISIT EST AGE 40-64: CPT | Mod: 25 | Performed by: STUDENT IN AN ORGANIZED HEALTH CARE EDUCATION/TRAINING PROGRAM

## 2024-02-23 PROCEDURE — 99214 OFFICE O/P EST MOD 30 MIN: CPT | Mod: 25 | Performed by: STUDENT IN AN ORGANIZED HEALTH CARE EDUCATION/TRAINING PROGRAM

## 2024-02-23 RX ORDER — TRAZODONE HYDROCHLORIDE 150 MG/1
150 TABLET ORAL AT BEDTIME
Qty: 90 TABLET | Refills: 3 | Status: SHIPPED | OUTPATIENT
Start: 2024-02-23

## 2024-02-23 RX ORDER — ATORVASTATIN CALCIUM 20 MG/1
20 TABLET, FILM COATED ORAL DAILY
Qty: 90 TABLET | Refills: 3 | Status: SHIPPED | OUTPATIENT
Start: 2024-02-23

## 2024-02-23 RX ORDER — TADALAFIL 20 MG/1
20 TABLET ORAL DAILY PRN
Qty: 14 TABLET | Refills: 11 | Status: SHIPPED | OUTPATIENT
Start: 2024-02-23 | End: 2024-02-23

## 2024-02-23 RX ORDER — TADALAFIL 20 MG/1
20 TABLET ORAL DAILY PRN
Qty: 30 TABLET | Refills: 11 | Status: SHIPPED | OUTPATIENT
Start: 2024-02-23

## 2024-02-23 ASSESSMENT — PAIN SCALES - GENERAL: PAINLEVEL: NO PAIN (0)

## 2024-02-23 NOTE — PATIENT INSTRUCTIONS
Preventive Care Advice   This is general advice given by our system to help you stay healthy. However, your care team may have specific advice just for you. Please talk to your care team about your preventive care needs.  Nutrition  Eat 5 or more servings of fruits and vegetables each day.  Try wheat bread, brown rice and whole grain pasta (instead of white bread, rice, and pasta).  Get enough calcium and vitamin D. Check the label on foods and aim for 100% of the RDA (recommended daily allowance).  Lifestyle  Exercise at least 150 minutes each week  (30 minutes a day, 5 days a week).  Do muscle strengthening activities 2 days a week. These help control your weight and prevent disease.  No smoking.  Wear sunscreen to prevent skin cancer.  Have a dental exam and cleaning every 6 months.  Yearly exams  See your health care team every year to talk about:  Any changes in your health.  Any medicines your care team has prescribed.  Preventive care, family planning, and ways to prevent chronic diseases.  Shots (vaccines)   HPV shots (up to age 26), if you've never had them before.  Hepatitis B shots (up to age 59), if you've never had them before.  COVID-19 shot: Get this shot when it's due.  Flu shot: Get a flu shot every year.  Tetanus shot: Get a tetanus shot every 10 years.  Pneumococcal, hepatitis A, and RSV shots: Ask your care team if you need these based on your risk.  Shingles shot (for age 50 and up)  General health tests  Diabetes screening:  Starting at age 35, Get screened for diabetes at least every 3 years.  If you are younger than age 35, ask your care team if you should be screened for diabetes.  Cholesterol test: At age 39, start having a cholesterol test every 5 years, or more often if advised.  Bone density scan (DEXA): At age 50, ask your care team if you should have this scan for osteoporosis (brittle bones).  Hepatitis C: Get tested at least once in your life.  STIs (sexually transmitted  infections)  Before age 24: Ask your care team if you should be screened for STIs.  After age 24: Get screened for STIs if you're at risk. You are at risk for STIs (including HIV) if:  You are sexually active with more than one person.  You don't use condoms every time.  You or a partner was diagnosed with a sexually transmitted infection.  If you are at risk for HIV, ask about PrEP medicine to prevent HIV.  Get tested for HIV at least once in your life, whether you are at risk for HIV or not.  Cancer screening tests  Cervical cancer screening: If you have a cervix, begin getting regular cervical cancer screening tests starting at age 21.  Breast cancer scan (mammogram): If you've ever had breasts, begin having regular mammograms starting at age 40. This is a scan to check for breast cancer.  Colon cancer screening: It is important to start screening for colon cancer at age 45.  Have a colonoscopy test every 10 years (or more often if you're at risk) Or, ask your provider about stool tests like a FIT test every year or Cologuard test every 3 years.  To learn more about your testing options, visit:   https://www.YogiPlay/170145.pdf.  For help making a decision, visit:   https://bit.ly/ej29020.  Prostate cancer screening test: If you have a prostate, ask your care team if a prostate cancer screening test (PSA) at age 55 is right for you.  Lung cancer screening: If you are a current or former smoker ages 50 to 80, ask your care team if ongoing lung cancer screenings are right for you.  For informational purposes only. Not to replace the advice of your health care provider. Copyright   2023 [facility]. All rights reserved. Clinically reviewed by the [facility]. [employer] 809984 - REV 01/24.    Learning About Stress  What is stress?     Stress is your body's response to a hard situation. Your body can have a physical, emotional, or mental response. Stress is a fact of life for  most people, and it affects everyone differently. What causes stress for you may not be stressful for someone else.  A lot of things can cause stress. You may feel stress when you go on a job interview, take a test, or run a race. This kind of short-term stress is normal and even useful. It can help you if you need to work hard or react quickly. For example, stress can help you finish an important job on time.  Long-term stress is caused by ongoing stressful situations or events. Examples of long-term stress include long-term health problems, ongoing problems at work, or conflicts in your family. Long-term stress can harm your health.  How does stress affect your health?  When you are stressed, your body responds as though you are in danger. It makes hormones that speed up your heart, make you breathe faster, and give you a burst of energy. This is called the fight-or-flight stress response. If the stress is over quickly, your body goes back to normal and no harm is done.  But if stress happens too often or lasts too long, it can have bad effects. Long-term stress can make you more likely to get sick, and it can make symptoms of some diseases worse. If you tense up when you are stressed, you may develop neck, shoulder, or low back pain. Stress is linked to high blood pressure and heart disease.  Stress also harms your emotional health. It can make you serna, tense, or depressed. Your relationships may suffer, and you may not do well at work or school.  What can you do to manage stress?  You can try these things to help manage stress:   Do something active. Exercise or activity can help reduce stress. Walking is a great way to get started. Even everyday activities such as housecleaning or yard work can help.  Try yoga or raffaele chi. These techniques combine exercise and meditation. You may need some training at first to learn them.  Do something you enjoy. For example, listen to music or go to a movie. Practice your  "hobby or do volunteer work.  Meditate. This can help you relax, because you are not worrying about what happened before or what may happen in the future.  Do guided imagery. Imagine yourself in any setting that helps you feel calm. You can use online videos, books, or a teacher to guide you.  Do breathing exercises. For example:  From a standing position, bend forward from the waist with your knees slightly bent. Let your arms dangle close to the floor.  Breathe in slowly and deeply as you return to a standing position. Roll up slowly and lift your head last.  Hold your breath for just a few seconds in the standing position.  Breathe out slowly and bend forward from the waist.  Let your feelings out. Talk, laugh, cry, and express anger when you need to. Talking with supportive friends or family, a counselor, or a rafael leader about your feelings is a healthy way to relieve stress. Avoid discussing your feelings with people who make you feel worse.  Write. It may help to write about things that are bothering you. This helps you find out how much stress you feel and what is causing it. When you know this, you can find better ways to cope.  What can you do to prevent stress?  You might try some of these things to help prevent stress:  Manage your time. This helps you find time to do the things you want and need to do.  Get enough sleep. Your body recovers from the stresses of the day while you are sleeping.  Get support. Your family, friends, and community can make a difference in how you experience stress.  Limit your news feed. Avoid or limit time on social media or news that may make you feel stressed.  Do something active. Exercise or activity can help reduce stress. Walking is a great way to get started.  Where can you learn more?  Go to https://www.healthwise.net/patiented  Enter N032 in the search box to learn more about \"Learning About Stress.\"  Current as of: February 26, 2023               Content Version: " 13.8    4243-7508 [employer].   Care instructions adapted under license by your healthcare professional. If you have questions about a medical condition or this instruction, always ask your healthcare professional. [employer] disclaims any warranty or liability for your use of this information.

## 2024-02-23 NOTE — NURSING NOTE
Prior to immunization administration, verified patients identity using patient s name and date of birth. Please see Immunization Activity for additional information.     Screening Questionnaire for Adult Immunization    Are you sick today?   No   Do you have allergies to medications, food, a vaccine component or latex?   No   Have you ever had a serious reaction after receiving a vaccination?   No   Do you have a long-term health problem with heart, lung, kidney, or metabolic disease (e.g., diabetes), asthma, a blood disorder, no spleen, complement component deficiency, a cochlear implant, or a spinal fluid leak?  Are you on long-term aspirin therapy?   No   Do you have cancer, leukemia, HIV/AIDS, or any other immune system problem?   No   Do you have a parent, brother, or sister with an immune system problem?   No   In the past 3 months, have you taken medications that affect  your immune system, such as prednisone, other steroids, or anticancer drugs; drugs for the treatment of rheumatoid arthritis, Crohn s disease, or psoriasis; or have you had radiation treatments?   No   Have you had a seizure, or a brain or other nervous system problem?   No   During the past year, have you received a transfusion of blood or blood    products, or been given immune (gamma) globulin or antiviral drug?   No   For women: Are you pregnant or is there a chance you could become       pregnant during the next month?   No   Have you received any vaccinations in the past 4 weeks?   No     Immunization questionnaire answers were all negative.      Patient instructed to remain in clinic for 15 minutes afterwards, and to report any adverse reactions.     Screening performed by Yas Bernal RN on 2/23/2024 at 2:58 PM.

## 2024-02-23 NOTE — PROGRESS NOTES
"Preventive Care Visit  [facility]  James Salomon DO, Family Medicine  Feb 23, 2024    Assessment & Plan     Routine general medical examination at a health care facility  -Vitals: WNL  -Labs: bmp, lipid, A1c, psa  -Immunizations: zoster  -Colon cancer screening: due 2029  -Prostate cancer screening: psa ordered    Hypercholesterolemia  - Lipid panel  - atorvastatin (LIPITOR) 20 MG tablet  Dispense: 90 tablet; Refill: 3    Screening for diabetes mellitus  - Hemoglobin A1c    Screening for prostate cancer  - PSA, screen    Insomnia, unspecified type  Refilled at higher dose 150mg.   - traZODone (DESYREL) 150 MG tablet  Dispense: 90 tablet; Refill: 3    Erectile dysfunction, unspecified erectile dysfunction type  Printed good RX coupon.  - tadalafil (CIALIS) 20 MG tablet  Dispense: 14 tablet; Refill: 11        BMI  Estimated body mass index is 31.17 kg/m  as calculated from the following:    Height as of this encounter: 1.727 m (5' 8\").    Weight as of this encounter: 93 kg (205 lb).   Weight management plan: Discussed healthy diet and exercise guidelines    Counseling  Appropriate preventive services were discussed with this patient, including applicable screening as appropriate for fall prevention, nutrition, physical activity, Tobacco-use cessation, weight loss and cognition.  Checklist reviewing preventive services available has been given to the patient.  Reviewed patient's diet, addressing concerns and/or questions.   He is at risk for lack of exercise and has been provided with information to increase physical activity for the benefit of his well-being.   He is at risk for psychosocial distress and has been provided with information to reduce risk.       Ted Rojas is a 63 year old, presenting for the following:  Physical (Pt stated that he got a cortisone shot in left knee about a week about on Thursday )        2/23/2024     2:17 PM   Additional Questions   Roomed by " Yadira Mcbride   Accompanied by Self        Health Care Directive  Patient does not have a Health Care Directive or Living Will: Discussed advance care planning with patient; information given to patient to review.    HPI    Work-  Diet-  Exercise-running, walking  Prostate cancer screening-DUE  Colon cancer screening-due 2029    HLD  -lipitor 20mg    Restless legs  -last ferritin normal    Insomnia  -trazodone    BPH  -due for PSA    ADHD  -adderall  -sees specialist    Elevated BP  L knee pain  -cortisone shot last week at [facility]  -meeting with [facility]         2/16/2024   General Health   How would you rate your overall physical health? Good   Feel stress (tense, anxious, or unable to sleep) Only a little   (!) STRESS CONCERN      2/16/2024   Nutrition   Three or more servings of calcium each day? Yes   Diet: Regular (no restrictions)   How many servings of fruit and vegetables per day? (!) 2-3   How many sweetened beverages each day? (!) 2         2/16/2024   Exercise   Days per week of moderate/strenous exercise 2 days   Average minutes spent exercising at this level 30 min   (!) EXERCISE CONCERN      2/16/2024   Social Factors   Frequency of gathering with friends or relatives Once a week   Worry food won't last until get money to buy more No   Food not last or not have enough money for food? No   Do you have housing?  Yes   Are you worried about losing your housing? No   Lack of transportation? No   Unable to get utilities (heat,electricity)? No         2/16/2024   Fall Risk   Fallen 2 or more times in the past year? No   Trouble with walking or balance? No          2/16/2024   Dental   Dentist two times every year? Yes         2/16/2024   TB Screening   Were you born outside of [place]?  No         Today's PHQ-2 Score:       2/22/2024     2:13 PM   PHQ-2 ( 1999 [employer])   Q1: Little interest or pleasure in doing things 0   Q2: Feeling down, depressed or hopeless 0   PHQ-2 Score 0   Q1: Little interest or  "pleasure in doing things Not at all   Q2: Feeling down, depressed or hopeless Not at all   PHQ-2 Score 0           2/16/2024   Substance Use   Alcohol more than 3/day or more than 7/wk No   Do you use any other substances recreationally? No     Social History     Tobacco Use    Smoking status: Never    Smokeless tobacco: Never   Vaping Use    Vaping Use: Never used   Substance Use Topics    Alcohol use: Not Currently     Comment: infrequent drinker - one glass per week    Drug use: Never         2/16/2024   STI Screening   New sexual partner(s) since last STI/HIV test? No     Last PSA:   Prostate Specific Antigen Screen   Date Value Ref Range Status   12/29/2022 0.48 0.00 - 4.50 ng/mL Final     ASCVD Risk   The 10-year ASCVD risk score (Anjelica CAT, et al., 2019) is: 6.5%    Values used to calculate the score:      Age: 63 years      Sex: Male      Is Non- : No      Diabetic: No      Tobacco smoker: No      Systolic Blood Pressure: 130 mmHg      Is BP treated: No      HDL Cholesterol: 55 mg/dL      Total Cholesterol: 137 mg/dL      Reviewed and updated as needed this visit by Provider   Tobacco   Meds   Med Hx  Surg Hx  Fam Hx  Soc Hx Sexual Activity               Objective    Exam  /80   Pulse 65   Temp 97.4  F (36.3  C) (Temporal)   Resp 16   Ht 1.727 m (5' 8\")   Wt 93 kg (205 lb)   SpO2 98%   BMI 31.17 kg/m     Estimated body mass index is 31.17 kg/m  as calculated from the following:    Height as of this encounter: 1.727 m (5' 8\").    Weight as of this encounter: 93 kg (205 lb).    Physical Exam  Constitutional:       General: He is not in acute distress.     Appearance: Normal appearance. He is well-developed. He is not ill-appearing.   HENT:      Head: Normocephalic and atraumatic.      Right Ear: Tympanic membrane, ear canal and external ear normal.      Left Ear: Tympanic membrane, ear canal and external ear normal.      Nose: Nose normal.      Mouth/Throat: "      Mouth: Mucous membranes are moist.      Pharynx: No oropharyngeal exudate.   Eyes:      Extraocular Movements: Extraocular movements intact.      Conjunctiva/sclera: Conjunctivae normal.      Pupils: Pupils are equal, round, and reactive to light.   Cardiovascular:      Rate and Rhythm: Normal rate and regular rhythm.      Heart sounds: Normal heart sounds. No murmur heard.  Pulmonary:      Effort: Pulmonary effort is normal.      Breath sounds: No wheezing or rales.   Abdominal:      General: Bowel sounds are normal.      Palpations: Abdomen is soft.      Tenderness: There is no abdominal tenderness.   Musculoskeletal:      Cervical back: Normal range of motion and neck supple. No rigidity.   Lymphadenopathy:      Cervical: No cervical adenopathy.   Skin:     General: Skin is warm and dry.      Findings: No rash.   Neurological:      General: No focal deficit present.      Mental Status: He is alert and oriented to person, place, and time.   Psychiatric:         Mood and Affect: Mood normal.         Signed Electronically by: James Salomon DO

## 2024-04-09 ENCOUNTER — TELEPHONE (OUTPATIENT)
Dept: DERMATOLOGY | Facility: CLINIC | Age: 64
End: 2024-04-09
Payer: COMMERCIAL

## 2024-04-09 NOTE — TELEPHONE ENCOUNTER
Patient confirmed scheduled appointment:  Date: 4/3/2025  Time: 11:30 am  Visit type: Return Derm  Provider: Isaias  Location: [facility]  Testing/imaging:   Additional notes:

## 2024-06-18 RX ORDER — GABAPENTIN 100 MG/1
CAPSULE ORAL
Qty: 90 CAPSULE | Refills: 1 | Status: SHIPPED | OUTPATIENT
Start: 2024-06-18

## 2024-06-18 NOTE — TELEPHONE ENCOUNTER
Message left letting him know prescription renewed but he is overdue for follow-up visit, left number for scheduling.Nicolasa Beckwith PA-C on 6/18/2024 at 11:44 AM

## 2024-08-16 NOTE — TELEPHONE ENCOUNTER
Central Prior Authorization Team   Phone: [phone]    PA Initiation    Medication: Tadalafil 2.5 MG Oral Tablet (CIALIS)  Insurance Company: [employer] - Phone [phone] Fax [phone]  Pharmacy Filling the Rx: [facility]ACY #[id number] - [place], [place] - [address]  Filling Pharmacy Phone: [phone]  Filling Pharmacy Fax:    Start Date: 1/12/2023         PAST MEDICAL HISTORY:  No pertinent past medical history

## 2024-10-18 DIAGNOSIS — G47.00 INSOMNIA, UNSPECIFIED TYPE: ICD-10-CM

## 2024-10-18 DIAGNOSIS — E78.00 HYPERCHOLESTEROLEMIA: ICD-10-CM

## 2024-10-18 RX ORDER — ATORVASTATIN CALCIUM 20 MG/1
20 TABLET, FILM COATED ORAL DAILY
Qty: 90 TABLET | Refills: 3 | Status: CANCELLED | OUTPATIENT
Start: 2024-10-18

## 2024-10-18 NOTE — TELEPHONE ENCOUNTER
"Patient requesting medications be transferred to a different location    \"I have moved from [place] to [place] and so I would like to change my pharmacist going forward.     I want my pharmacy to change from the [facility] in [facility] to this pharmacy:     [facility]  [address]  [place] [address]     Please make sure that these prescriptions go to the [facility]:     Atorvastatin 20 MG tablets  Trazodone 150 MG tablets     Let me know if you have any more questions.     thanks,  Bob\"      "

## 2024-10-20 RX ORDER — TRAZODONE HYDROCHLORIDE 150 MG/1
150 TABLET ORAL AT BEDTIME
Qty: 90 TABLET | Refills: 0 | Status: SHIPPED | OUTPATIENT
Start: 2024-10-20

## 2024-10-20 RX ORDER — ATORVASTATIN CALCIUM 20 MG/1
20 TABLET, FILM COATED ORAL DAILY
Qty: 90 TABLET | Refills: 0 | Status: SHIPPED | OUTPATIENT
Start: 2024-10-20

## 2024-10-20 RX ORDER — TRAZODONE HYDROCHLORIDE 150 MG/1
150 TABLET ORAL AT BEDTIME
Qty: 90 TABLET | Refills: 3 | Status: CANCELLED | OUTPATIENT
Start: 2024-10-20

## 2024-10-27 ENCOUNTER — MYC MEDICAL ADVICE (OUTPATIENT)
Dept: FAMILY MEDICINE | Facility: CLINIC | Age: 64
End: 2024-10-27
Payer: COMMERCIAL

## 2024-11-27 ENCOUNTER — PRE VISIT (OUTPATIENT)
Dept: UROLOGY | Facility: CLINIC | Age: 64
End: 2024-11-27
Payer: COMMERCIAL

## 2024-11-27 NOTE — TELEPHONE ENCOUNTER
Reason for visit: ED treatment options     Relevant information: elevated Prolactin level, ED    Records/imaging/labs/orders: All records available    At Rooming:  Standard rooming      Martin Manley  11/27/2024  12:05 PM

## 2025-01-17 ENCOUNTER — TELEPHONE (OUTPATIENT)
Dept: UROLOGY | Facility: CLINIC | Age: 65
End: 2025-01-17

## 2025-01-17 NOTE — TELEPHONE ENCOUNTER
Prior Authorization Retail Medication Request    Medication/Dose:   Diagnosis and ICD code (if different than what is on RX):  N52.9  New/renewal/insurance change PA/secondary ins. PA:  Previously Tried and Failed:  Viagra & Cialis   Rationale:  flushed face and only provided 40 % erection which he was unable to penetrate. Patient had headache lasting 24-48 hours after taking     Insurance   Primary: [employer]   Insurance ID:  [id number]     Secondary (if applicable):  Insurance ID:      Pharmacy Information (if different than what is on RX)  Name:  [facility]  Phone:  [phone]  Fax:[phone]    Clinic Information  Preferred routing pool for dept communication: urology triage pool

## 2025-01-21 NOTE — TELEPHONE ENCOUNTER
Prior Authorization Not Needed per Pharmacy    Medication: CAVERJECT 20 MCG IC SOLR  Insurance Company: [email or web address] - Phone [phone] Fax [phone]  Expected CoPay: $    Pharmacy Filling the Rx: [facility] - [place] [id number]  Pharmacy Notified: Yes they have a paid claim dated 01/19/2025 60.00 Co-Pay 1 Kit per 30 days  Patient Notified: The pharmacy will notify the patient.

## 2025-02-04 ENCOUNTER — MYC MEDICAL ADVICE (OUTPATIENT)
Dept: FAMILY MEDICINE | Facility: CLINIC | Age: 65
End: 2025-02-04
Payer: COMMERCIAL

## 2025-02-19 DIAGNOSIS — N52.9 ERECTILE DYSFUNCTION, UNSPECIFIED ERECTILE DYSFUNCTION TYPE: Primary | ICD-10-CM

## 2025-02-19 NOTE — PROGRESS NOTES
"Superhuman message to Mr. Rodriguez today:        Hi Mr. Rodriguez,    Edex comes as one larger dosage, a 40 mcg syringe.  I sent a prescription for this to [facility].    Since the 20 mcg injection was not working well, I say increase the dose to 30 mcg (this would be giving 75% of the 40 mcg syringe).        If 30 mcg is not adequate to give an erection that will last 30-60 minutes, and be rigid enough for intercourse, you can try increasing the dose to 35-40 mcg.    If the 40 mcg is not sufficient, we can look at prescribing a stronger 3 drug mix we call \"Trimix\".      It is always best to use the lowest effective dosage.  We want to avoid too high of a dosage that would cause a priapism.  Priapism is a long painful erection that does not go down for 2 to 3 hours or more.  If that occurs, that is an emergency, and we need to see you in clinic or the emergency department for a reversal injection.  Just a precaution to watch for.    If you need help setting anything up, please contact my  RN care coordinator Rosalinda Grajeda [phone].  Or contact us via Superhuman    Thank you,  Jae GARZA   "

## 2025-02-19 NOTE — PROGRESS NOTES
Edex 40 mcg kits sent  Poor response with 20 mcg dosing  Will advise increase to 30 mcg, 35-40 mcg if needed.  If titrating up to 40 is not successful, we can move to a Trimix.

## 2025-02-25 DIAGNOSIS — N52.9 ERECTILE DYSFUNCTION, UNSPECIFIED ERECTILE DYSFUNCTION TYPE: ICD-10-CM

## 2025-02-25 DIAGNOSIS — N53.19 ANEJACULATION: Primary | ICD-10-CM

## 2025-03-23 ENCOUNTER — HEALTH MAINTENANCE LETTER (OUTPATIENT)
Age: 65
End: 2025-03-23

## 2025-07-01 ENCOUNTER — TELEPHONE (OUTPATIENT)
Dept: UROLOGY | Facility: CLINIC | Age: 65
End: 2025-07-01
Payer: COMMERCIAL

## 2025-07-01 NOTE — TELEPHONE ENCOUNTER
Prior Authorization Retail Medication Request     Medication/Dose: Trimix intracavernosal injection compound   Diagnosis and ICD code (if different than what is on RX):        New/renewal/insurance change PA/secondary ins. PA:  Previously Tried and Failed:  see chart  Rationale:  see chart - letter in chart for PA      Insurance   Primary: [employer]  Insurance ID:  [id number]  BIN:[id number]  PCN: [id number]     Secondary (if applicable):  Insurance ID:   BIN:   PCN:      Pharmacy Information (if different than what is on RX)  Name:  [facility]  Phone:[phone]     Fax:[phone]

## 2025-07-02 NOTE — TELEPHONE ENCOUNTER
PRIOR AUTHORIZATION DENIED    Medication: COMPOUNDED NON-CONTROLLED (CMPD RX) - PHARMACY TO MIX COMPOUNDED MEDICATION  Insurance Company: [employer] - Phone [phone] Fax [phone]  Denial Date: 7/2/2025  Denial Reason(s):           Appeal Information:         Patient Notified: No

## 2025-07-02 NOTE — TELEPHONE ENCOUNTER
Retail Pharmacy Prior Authorization Team   Phone: [phone]    PA Initiation    Medication: COMPOUNDED NON-CONTROLLED (CMPD RX) - PHARMACY TO MIX COMPOUNDED MEDICATION  Insurance Company: [employer] - Phone [phone] Fax [phone]  Pharmacy Filling the Rx: [facility] - [place], [place] - [address]  Filling Pharmacy Phone: [phone]  Filling Pharmacy Fax:    Start Date: 7/2/2025